# Patient Record
Sex: MALE | Race: WHITE | NOT HISPANIC OR LATINO | Employment: OTHER | URBAN - METROPOLITAN AREA
[De-identification: names, ages, dates, MRNs, and addresses within clinical notes are randomized per-mention and may not be internally consistent; named-entity substitution may affect disease eponyms.]

---

## 2017-01-05 DIAGNOSIS — K21.9 GASTROESOPHAGEAL REFLUX DISEASE, ESOPHAGITIS PRESENCE NOT SPECIFIED: Primary | ICD-10-CM

## 2017-01-05 RX ORDER — LANSOPRAZOLE 30 MG/1
30 CAPSULE, DELAYED RELEASE ORAL DAILY
Qty: 90 CAPSULE | Refills: 0 | Status: SHIPPED | OUTPATIENT
Start: 2017-01-05 | End: 2017-05-09 | Stop reason: SDUPTHER

## 2017-01-19 RX ORDER — ATORVASTATIN CALCIUM 20 MG/1
TABLET, FILM COATED ORAL
Qty: 30 TABLET | Refills: 2 | Status: SHIPPED | OUTPATIENT
Start: 2017-01-19 | End: 2017-04-02 | Stop reason: SDUPTHER

## 2017-04-03 RX ORDER — ATORVASTATIN CALCIUM 20 MG/1
TABLET, FILM COATED ORAL
Qty: 30 TABLET | Refills: 1 | Status: SHIPPED | OUTPATIENT
Start: 2017-04-03 | End: 2017-06-07 | Stop reason: SDUPTHER

## 2017-05-09 DIAGNOSIS — K21.9 GASTROESOPHAGEAL REFLUX DISEASE, ESOPHAGITIS PRESENCE NOT SPECIFIED: ICD-10-CM

## 2017-05-09 RX ORDER — LANSOPRAZOLE 30 MG/1
30 CAPSULE, DELAYED RELEASE ORAL DAILY
Qty: 90 CAPSULE | Refills: 0 | Status: SHIPPED | OUTPATIENT
Start: 2017-05-09 | End: 2017-08-07 | Stop reason: SDUPTHER

## 2017-06-07 RX ORDER — ATORVASTATIN CALCIUM 20 MG/1
TABLET, FILM COATED ORAL
Qty: 30 TABLET | Refills: 0 | Status: SHIPPED | OUTPATIENT
Start: 2017-06-07 | End: 2017-08-08 | Stop reason: SDUPTHER

## 2017-07-05 RX ORDER — ATORVASTATIN CALCIUM 20 MG/1
TABLET, FILM COATED ORAL
Qty: 30 TABLET | Refills: 0 | OUTPATIENT
Start: 2017-07-05

## 2017-07-10 RX ORDER — ATORVASTATIN CALCIUM 20 MG/1
TABLET, FILM COATED ORAL
Qty: 30 TABLET | Refills: 0 | OUTPATIENT
Start: 2017-07-10

## 2017-08-07 DIAGNOSIS — K21.9 GASTROESOPHAGEAL REFLUX DISEASE, ESOPHAGITIS PRESENCE NOT SPECIFIED: ICD-10-CM

## 2017-08-07 RX ORDER — LANSOPRAZOLE 30 MG/1
CAPSULE, DELAYED RELEASE ORAL
Qty: 90 CAPSULE | Refills: 3 | Status: SHIPPED | OUTPATIENT
Start: 2017-08-07 | End: 2018-08-02 | Stop reason: SDUPTHER

## 2017-08-08 RX ORDER — ATORVASTATIN CALCIUM 20 MG/1
TABLET, FILM COATED ORAL
Qty: 15 TABLET | Refills: 0 | Status: SHIPPED | OUTPATIENT
Start: 2017-08-08 | End: 2017-08-15

## 2017-08-11 RX ORDER — ATORVASTATIN CALCIUM 20 MG/1
TABLET, FILM COATED ORAL
Qty: 30 TABLET | Refills: 5 | Status: SHIPPED | OUTPATIENT
Start: 2017-08-11 | End: 2017-08-15 | Stop reason: SDUPTHER

## 2017-08-15 ENCOUNTER — OFFICE VISIT (OUTPATIENT)
Dept: FAMILY MEDICINE CLINIC | Facility: CLINIC | Age: 67
End: 2017-08-15

## 2017-08-15 ENCOUNTER — APPOINTMENT (OUTPATIENT)
Dept: LAB | Facility: HOSPITAL | Age: 67
End: 2017-08-15

## 2017-08-15 VITALS
TEMPERATURE: 98.5 F | SYSTOLIC BLOOD PRESSURE: 120 MMHG | WEIGHT: 175 LBS | HEART RATE: 78 BPM | HEIGHT: 72 IN | BODY MASS INDEX: 23.7 KG/M2 | DIASTOLIC BLOOD PRESSURE: 84 MMHG | OXYGEN SATURATION: 99 %

## 2017-08-15 DIAGNOSIS — Z00.8 ENCOUNTER FOR OTHER GENERAL EXAMINATION: Primary | ICD-10-CM

## 2017-08-15 DIAGNOSIS — Z72.0 TOBACCO ABUSE: ICD-10-CM

## 2017-08-15 DIAGNOSIS — E78.2 MIXED HYPERLIPIDEMIA: ICD-10-CM

## 2017-08-15 DIAGNOSIS — Z98.890 STATUS POST ROTATOR CUFF REPAIR: ICD-10-CM

## 2017-08-15 DIAGNOSIS — Z11.59 NEED FOR HEPATITIS C SCREENING TEST: ICD-10-CM

## 2017-08-15 DIAGNOSIS — Z12.5 PROSTATE CANCER SCREENING: ICD-10-CM

## 2017-08-15 LAB
ALBUMIN SERPL-MCNC: 4.5 G/DL (ref 3.2–4.8)
ALBUMIN/GLOB SERPL: 2.1 G/DL (ref 1.5–2.5)
ALP SERPL-CCNC: 54 U/L (ref 25–100)
ALT SERPL W P-5'-P-CCNC: 47 U/L (ref 7–40)
ANION GAP SERPL CALCULATED.3IONS-SCNC: -1 MMOL/L (ref 3–11)
ARTICHOKE IGE QN: 59 MG/DL (ref 0–130)
AST SERPL-CCNC: 35 U/L (ref 0–33)
BILIRUB BLD-MCNC: NEGATIVE MG/DL
BILIRUB SERPL-MCNC: 3.2 MG/DL (ref 0.3–1.2)
BUN BLD-MCNC: 19 MG/DL (ref 9–23)
BUN/CREAT SERPL: 21.1 (ref 7–25)
CALCIUM SPEC-SCNC: 9.2 MG/DL (ref 8.7–10.4)
CHLORIDE SERPL-SCNC: 107 MMOL/L (ref 99–109)
CHOLEST SERPL-MCNC: 145 MG/DL (ref 0–200)
CLARITY, POC: CLEAR
CO2 SERPL-SCNC: 34 MMOL/L (ref 20–31)
COLOR UR: YELLOW
CREAT BLD-MCNC: 0.9 MG/DL (ref 0.6–1.3)
DEPRECATED RDW RBC AUTO: 41.2 FL (ref 37–54)
ERYTHROCYTE [DISTWIDTH] IN BLOOD BY AUTOMATED COUNT: 12.2 % (ref 11.3–14.5)
GFR SERPL CREATININE-BSD FRML MDRD: 84 ML/MIN/1.73
GLOBULIN UR ELPH-MCNC: 2.1 GM/DL
GLUCOSE BLD-MCNC: 100 MG/DL (ref 70–100)
GLUCOSE UR STRIP-MCNC: NEGATIVE MG/DL
HCT VFR BLD AUTO: 42.5 % (ref 38.9–50.9)
HCV AB SER DONR QL: NORMAL
HDLC SERPL-MCNC: 73 MG/DL (ref 40–60)
HGB BLD-MCNC: 15 G/DL (ref 13.1–17.5)
KETONES UR QL: NEGATIVE
LEUKOCYTE EST, POC: NEGATIVE
MCH RBC QN AUTO: 32.4 PG (ref 27–31)
MCHC RBC AUTO-ENTMCNC: 35.3 G/DL (ref 32–36)
MCV RBC AUTO: 91.8 FL (ref 80–99)
NITRITE UR-MCNC: NEGATIVE MG/ML
PH UR: 7 [PH] (ref 5–8)
PLATELET # BLD AUTO: 88 10*3/MM3 (ref 150–450)
PMV BLD AUTO: 10.2 FL (ref 6–12)
POTASSIUM BLD-SCNC: 4.3 MMOL/L (ref 3.5–5.5)
PROT SERPL-MCNC: 6.6 G/DL (ref 5.7–8.2)
PROT UR STRIP-MCNC: NEGATIVE MG/DL
PSA SERPL-MCNC: 0.2 NG/ML (ref 0–4)
RBC # BLD AUTO: 4.63 10*6/MM3 (ref 4.2–5.76)
RBC # UR STRIP: NEGATIVE /UL
SODIUM BLD-SCNC: 140 MMOL/L (ref 132–146)
SP GR UR: 1.01 (ref 1–1.03)
T4 FREE SERPL-MCNC: 0.98 NG/DL (ref 0.89–1.76)
TRIGL SERPL-MCNC: 75 MG/DL (ref 0–150)
TSH SERPL DL<=0.05 MIU/L-ACNC: 3.09 MIU/ML (ref 0.35–5.35)
UROBILINOGEN UR QL: ABNORMAL
WBC NRBC COR # BLD: 3.98 10*3/MM3 (ref 3.5–10.8)

## 2017-08-15 PROCEDURE — 86803 HEPATITIS C AB TEST: CPT | Performed by: FAMILY MEDICINE

## 2017-08-15 PROCEDURE — 85027 COMPLETE CBC AUTOMATED: CPT | Performed by: FAMILY MEDICINE

## 2017-08-15 PROCEDURE — 99397 PER PM REEVAL EST PAT 65+ YR: CPT | Performed by: FAMILY MEDICINE

## 2017-08-15 PROCEDURE — G0103 PSA SCREENING: HCPCS | Performed by: FAMILY MEDICINE

## 2017-08-15 PROCEDURE — 36415 COLL VENOUS BLD VENIPUNCTURE: CPT | Performed by: FAMILY MEDICINE

## 2017-08-15 PROCEDURE — 93000 ELECTROCARDIOGRAM COMPLETE: CPT | Performed by: FAMILY MEDICINE

## 2017-08-15 PROCEDURE — 80053 COMPREHEN METABOLIC PANEL: CPT | Performed by: FAMILY MEDICINE

## 2017-08-15 PROCEDURE — 80061 LIPID PANEL: CPT | Performed by: FAMILY MEDICINE

## 2017-08-15 PROCEDURE — 84439 ASSAY OF FREE THYROXINE: CPT | Performed by: FAMILY MEDICINE

## 2017-08-15 PROCEDURE — 84443 ASSAY THYROID STIM HORMONE: CPT | Performed by: FAMILY MEDICINE

## 2017-08-15 PROCEDURE — 81003 URINALYSIS AUTO W/O SCOPE: CPT | Performed by: FAMILY MEDICINE

## 2017-08-15 RX ORDER — ATORVASTATIN CALCIUM 20 MG/1
20 TABLET, FILM COATED ORAL DAILY
Qty: 30 TABLET | Refills: 11 | Status: SHIPPED | OUTPATIENT
Start: 2017-08-15 | End: 2018-09-12 | Stop reason: SDUPTHER

## 2017-08-15 NOTE — PROGRESS NOTES
Subjective   Tucker Johnson is a 67 y.o. male    HPI Comments: Patient presents today for annual physical exam and wellness visit.  He has chronic problems of hyperlipidemia, GERD, and allergic rhinitis.  He reports relatively recent right rotator cuff surgery repair that apparently went well.  Prior to that he had suffered a right biceps tear.  He has no acute medical complaints today.  His medication list is reviewed.  He has a colonoscopy scheduled next week with Dr. Lorenzo.  He received flu, shingles and pneumonia vaccines in 2016.  He is fasting for lab studies today.      The following portions of the patient's history were reviewed and updated as appropriate: allergies, current medications, past social history and problem list    Review of Systems   Constitutional: Negative.  Negative for chills, fatigue and fever.   HENT: Negative.    Eyes: Negative.    Respiratory: Negative.  Negative for cough, chest tightness, shortness of breath and wheezing.    Cardiovascular: Negative.  Negative for chest pain, palpitations and leg swelling.   Gastrointestinal: Negative.  Negative for abdominal pain, nausea and vomiting.   Endocrine: Negative.  Negative for polydipsia, polyphagia and polyuria.   Genitourinary: Negative.  Negative for dysuria, frequency and urgency.   Musculoskeletal: Negative.  Negative for arthralgias, back pain and myalgias.   Skin: Negative.  Negative for color change and rash.   Allergic/Immunologic: Negative.    Neurological: Negative.  Negative for weakness and headaches.   Hematological: Negative.  Negative for adenopathy. Does not bruise/bleed easily.   Psychiatric/Behavioral: Negative.    All other systems reviewed and are negative.      Objective     Vitals:    08/15/17 0902   BP: 120/84   Pulse: 78   Temp: 98.5 °F (36.9 °C)   SpO2: 99%       Physical Exam   Constitutional: He is oriented to person, place, and time. He appears well-developed and well-nourished.   HENT:   Head: Normocephalic and  atraumatic.   Right Ear: External ear normal.   Left Ear: External ear normal.   Nose: Nose normal.   Mouth/Throat: Oropharynx is clear and moist.   Eyes: Conjunctivae and EOM are normal. Pupils are equal, round, and reactive to light.   Neck: Normal range of motion. Neck supple. No thyromegaly present.   Cardiovascular: Normal rate, regular rhythm, normal heart sounds and intact distal pulses.    No murmur heard.  Pulmonary/Chest: Effort normal and breath sounds normal.   Abdominal: Soft. Bowel sounds are normal. He exhibits no mass. There is no tenderness.   Genitourinary: Rectum normal, prostate normal and penis normal.   Musculoskeletal: Normal range of motion. He exhibits deformity. He exhibits no edema.   Biceps deformity consistent with ruptured biceps tendon. FROM.   Lymphadenopathy:     He has no cervical adenopathy.   Neurological: He is alert and oriented to person, place, and time. He has normal reflexes. No cranial nerve deficit.   Skin: Skin is warm and dry. No rash noted.   Psychiatric: He has a normal mood and affect. His behavior is normal.   Nursing note and vitals reviewed.        ECG 12 Lead  Date/Time: 8/15/2017 5:12 PM  Performed by: ELIZABETH HARTMAN  Authorized by: ELIZABETH HARTMAN   Comparison: not compared with previous ECG   Rhythm: sinus rhythm  Rate: normal  Conduction: conduction normal  ST Segments: ST segments normal  T Waves: T waves normal  QRS axis: normal  Other: no other findings  Clinical impression: normal ECG          Assessment/Plan   Problem List Items Addressed This Visit     None      Visit Diagnoses     Encounter for other general examination    -  Primary    Relevant Orders    POC Urinalysis Dipstick, Automated (Completed)    CBC (No Diff) (Completed)    Comprehensive Metabolic Panel (Completed)    Lipid Panel (Completed)    TSH (Completed)    T4, Free (Completed)    Prostate cancer screening        Relevant Orders    PSA Screen (Completed)    Need for  hepatitis C screening test        Relevant Orders    Hepatitis C Antibody (Completed)    Mixed hyperlipidemia        Relevant Medications    atorvastatin (LIPITOR) 20 MG tablet    Other Relevant Orders    Comprehensive Metabolic Panel (Completed)    Lipid Panel (Completed)    Status post rotator cuff repair        Tobacco abuse            Patient is counseled during today's visit regarding preventative health measures to include use of seatbelts, medication safety, healthy diet and regular exercise.

## 2017-08-24 ENCOUNTER — OUTSIDE FACILITY SERVICE (OUTPATIENT)
Dept: GASTROENTEROLOGY | Facility: CLINIC | Age: 67
End: 2017-08-24

## 2017-08-24 PROCEDURE — G0105 COLORECTAL SCRN; HI RISK IND: HCPCS | Performed by: INTERNAL MEDICINE

## 2017-11-14 DIAGNOSIS — H69.80 EUSTACHIAN TUBE DYSFUNCTION, UNSPECIFIED LATERALITY: ICD-10-CM

## 2017-11-14 RX ORDER — PSEUDOEPHEDRINE HYDROCHLORIDE 120 MG/1
TABLET, FILM COATED, EXTENDED RELEASE ORAL
Qty: 60 TABLET | Refills: 10 | Status: SHIPPED | OUTPATIENT
Start: 2017-11-14 | End: 2019-03-07 | Stop reason: SDUPTHER

## 2018-07-02 ENCOUNTER — LAB (OUTPATIENT)
Dept: LAB | Facility: HOSPITAL | Age: 68
End: 2018-07-02

## 2018-07-02 ENCOUNTER — OFFICE VISIT (OUTPATIENT)
Dept: FAMILY MEDICINE CLINIC | Facility: CLINIC | Age: 68
End: 2018-07-02

## 2018-07-02 VITALS
HEIGHT: 72 IN | DIASTOLIC BLOOD PRESSURE: 80 MMHG | BODY MASS INDEX: 26.3 KG/M2 | SYSTOLIC BLOOD PRESSURE: 130 MMHG | HEART RATE: 78 BPM | OXYGEN SATURATION: 98 % | WEIGHT: 194.2 LBS | TEMPERATURE: 97.4 F

## 2018-07-02 DIAGNOSIS — I77.6 VASCULITIS (HCC): ICD-10-CM

## 2018-07-02 DIAGNOSIS — I77.6 VASCULITIS (HCC): Primary | ICD-10-CM

## 2018-07-02 LAB
ALBUMIN SERPL-MCNC: 4.53 G/DL (ref 3.2–4.8)
ALBUMIN/GLOB SERPL: 2.2 G/DL (ref 1.5–2.5)
ALP SERPL-CCNC: 47 U/L (ref 25–100)
ALT SERPL W P-5'-P-CCNC: 43 U/L (ref 7–40)
ANION GAP SERPL CALCULATED.3IONS-SCNC: 7 MMOL/L (ref 3–11)
AST SERPL-CCNC: 32 U/L (ref 0–33)
BASOPHILS # BLD AUTO: 0.02 10*3/MM3 (ref 0–0.2)
BASOPHILS NFR BLD AUTO: 0.4 % (ref 0–1)
BILIRUB SERPL-MCNC: 3.7 MG/DL (ref 0.3–1.2)
BUN BLD-MCNC: 18 MG/DL (ref 9–23)
BUN/CREAT SERPL: 18.6 (ref 7–25)
CALCIUM SPEC-SCNC: 9.2 MG/DL (ref 8.7–10.4)
CHLORIDE SERPL-SCNC: 108 MMOL/L (ref 99–109)
CO2 SERPL-SCNC: 29 MMOL/L (ref 20–31)
CREAT BLD-MCNC: 0.97 MG/DL (ref 0.6–1.3)
CRP SERPL-MCNC: <0.01 MG/DL (ref 0–1)
DEPRECATED RDW RBC AUTO: 42.4 FL (ref 37–54)
EOSINOPHIL # BLD AUTO: 0.11 10*3/MM3 (ref 0–0.3)
EOSINOPHIL NFR BLD AUTO: 2.3 % (ref 0–3)
ERYTHROCYTE [DISTWIDTH] IN BLOOD BY AUTOMATED COUNT: 12.4 % (ref 11.3–14.5)
ERYTHROCYTE [SEDIMENTATION RATE] IN BLOOD: <1 MM/HR (ref 0–20)
GFR SERPL CREATININE-BSD FRML MDRD: 77 ML/MIN/1.73
GLOBULIN UR ELPH-MCNC: 2.1 GM/DL
GLUCOSE BLD-MCNC: 98 MG/DL (ref 70–100)
HCT VFR BLD AUTO: 43.8 % (ref 38.9–50.9)
HGB BLD-MCNC: 15.4 G/DL (ref 13.1–17.5)
IMM GRANULOCYTES # BLD: 0.01 10*3/MM3 (ref 0–0.03)
IMM GRANULOCYTES NFR BLD: 0.2 % (ref 0–0.6)
LYMPHOCYTES # BLD AUTO: 1.95 10*3/MM3 (ref 0.6–4.8)
LYMPHOCYTES NFR BLD AUTO: 40 % (ref 24–44)
MCH RBC QN AUTO: 33 PG (ref 27–31)
MCHC RBC AUTO-ENTMCNC: 35.2 G/DL (ref 32–36)
MCV RBC AUTO: 94 FL (ref 80–99)
MONOCYTES # BLD AUTO: 0.59 10*3/MM3 (ref 0–1)
MONOCYTES NFR BLD AUTO: 12.1 % (ref 0–12)
NEUTROPHILS # BLD AUTO: 2.21 10*3/MM3 (ref 1.5–8.3)
NEUTROPHILS NFR BLD AUTO: 45.2 % (ref 41–71)
PLATELET # BLD AUTO: 78 10*3/MM3 (ref 150–450)
PMV BLD AUTO: 11.5 FL (ref 6–12)
POTASSIUM BLD-SCNC: 4.6 MMOL/L (ref 3.5–5.5)
PROT SERPL-MCNC: 6.6 G/DL (ref 5.7–8.2)
RBC # BLD AUTO: 4.66 10*6/MM3 (ref 4.2–5.76)
SODIUM BLD-SCNC: 144 MMOL/L (ref 132–146)
WBC NRBC COR # BLD: 4.88 10*3/MM3 (ref 3.5–10.8)

## 2018-07-02 PROCEDURE — 99213 OFFICE O/P EST LOW 20 MIN: CPT | Performed by: FAMILY MEDICINE

## 2018-07-02 PROCEDURE — 80053 COMPREHEN METABOLIC PANEL: CPT

## 2018-07-02 PROCEDURE — 86038 ANTINUCLEAR ANTIBODIES: CPT

## 2018-07-02 PROCEDURE — 36415 COLL VENOUS BLD VENIPUNCTURE: CPT

## 2018-07-02 PROCEDURE — 85025 COMPLETE CBC W/AUTO DIFF WBC: CPT

## 2018-07-02 PROCEDURE — 86140 C-REACTIVE PROTEIN: CPT

## 2018-07-02 NOTE — PROGRESS NOTES
Subjective   Tucker Johnson is a 68 y.o. male    Patient presents for follow-up related to a rash on his lower extremities.  Primarily occurred on the left foot and ankle but also had some lesions on the right foot and ankle.  He was seen at urgent care on these were described as vasculitis type lesions.  He was treated with a steroid Dosepak and his lesions resolved.  He has had return of a few petechial type lesions on the left foot but nothing like what he had initially.  He is concerned about what all this could mean.  He has had no pain, fever, chills, arthralgias, myalgias or other symptoms.      Rash         The following portions of the patient's history were reviewed and updated as appropriate: allergies, current medications, past social history and problem list    Review of Systems   Constitutional: Negative.    HENT: Negative.    Eyes: Negative.    Respiratory: Negative.    Cardiovascular: Negative.    Endocrine: Negative.    Genitourinary: Negative.    Musculoskeletal: Negative.    Skin: Positive for rash.   Neurological: Negative.    Hematological: Negative.        Objective     Vitals:    07/02/18 1035   BP: 130/80   Pulse: 78   Temp: 97.4 °F (36.3 °C)   SpO2: 98%       Physical Exam   Constitutional: He is oriented to person, place, and time.   Cardiovascular: Normal rate and regular rhythm.    Pulmonary/Chest: Effort normal.   Abdominal: Soft. He exhibits no mass. There is no tenderness.   Neurological: He is alert and oriented to person, place, and time.   Skin:   10-12 petechial lesions left foot.  No other skin lesions, rash or vesicular changes noted.   Psychiatric: He has a normal mood and affect.   Nursing note and vitals reviewed.      Assessment/Plan   Problem List Items Addressed This Visit     None      Visit Diagnoses     Vasculitis    -  Primary    Relevant Orders    CBC & Differential    Comprehensive Metabolic Panel    Nuclear Antigen Antibody, IFA    C-reactive Protein    Sedimentation  Rate

## 2018-07-03 LAB
ANA HOMOGEN TITR SER: NORMAL {TITER}
ANA SER QL IA: POSITIVE
Lab: NORMAL

## 2018-08-02 DIAGNOSIS — K21.9 GASTROESOPHAGEAL REFLUX DISEASE, ESOPHAGITIS PRESENCE NOT SPECIFIED: ICD-10-CM

## 2018-08-02 RX ORDER — LANSOPRAZOLE 30 MG/1
CAPSULE, DELAYED RELEASE ORAL
Qty: 90 CAPSULE | Refills: 0 | Status: SHIPPED | OUTPATIENT
Start: 2018-08-02 | End: 2018-10-31 | Stop reason: SDUPTHER

## 2018-08-21 ENCOUNTER — OFFICE VISIT (OUTPATIENT)
Dept: FAMILY MEDICINE CLINIC | Facility: CLINIC | Age: 68
End: 2018-08-21

## 2018-08-21 ENCOUNTER — LAB (OUTPATIENT)
Dept: LAB | Facility: HOSPITAL | Age: 68
End: 2018-08-21

## 2018-08-21 VITALS
OXYGEN SATURATION: 99 % | SYSTOLIC BLOOD PRESSURE: 118 MMHG | TEMPERATURE: 98.5 F | HEIGHT: 72 IN | WEIGHT: 193 LBS | DIASTOLIC BLOOD PRESSURE: 74 MMHG | BODY MASS INDEX: 26.14 KG/M2 | HEART RATE: 69 BPM

## 2018-08-21 DIAGNOSIS — Z00.00 ROUTINE GENERAL MEDICAL EXAMINATION AT A HEALTH CARE FACILITY: Primary | ICD-10-CM

## 2018-08-21 DIAGNOSIS — E78.2 MIXED HYPERLIPIDEMIA: ICD-10-CM

## 2018-08-21 DIAGNOSIS — Z12.5 PROSTATE CANCER SCREENING: ICD-10-CM

## 2018-08-21 DIAGNOSIS — R53.83 FATIGUE, UNSPECIFIED TYPE: ICD-10-CM

## 2018-08-21 DIAGNOSIS — K21.9 GASTROESOPHAGEAL REFLUX DISEASE, ESOPHAGITIS PRESENCE NOT SPECIFIED: ICD-10-CM

## 2018-08-21 DIAGNOSIS — J30.9 CHRONIC ALLERGIC RHINITIS, UNSPECIFIED SEASONALITY, UNSPECIFIED TRIGGER: ICD-10-CM

## 2018-08-21 LAB
ARTICHOKE IGE QN: 68 MG/DL (ref 0–130)
CHOLEST SERPL-MCNC: 157 MG/DL (ref 0–200)
HDLC SERPL-MCNC: 76 MG/DL (ref 40–60)
PSA SERPL-MCNC: 0.22 NG/ML (ref 0–4)
T4 FREE SERPL-MCNC: 0.88 NG/DL (ref 0.89–1.76)
TRIGL SERPL-MCNC: 74 MG/DL (ref 0–150)
TSH SERPL DL<=0.05 MIU/L-ACNC: 3.54 MIU/ML (ref 0.35–5.35)

## 2018-08-21 PROCEDURE — 84403 ASSAY OF TOTAL TESTOSTERONE: CPT

## 2018-08-21 PROCEDURE — 99397 PER PM REEVAL EST PAT 65+ YR: CPT | Performed by: FAMILY MEDICINE

## 2018-08-21 PROCEDURE — 93000 ELECTROCARDIOGRAM COMPLETE: CPT | Performed by: FAMILY MEDICINE

## 2018-08-21 PROCEDURE — 84402 ASSAY OF FREE TESTOSTERONE: CPT

## 2018-08-21 PROCEDURE — 80061 LIPID PANEL: CPT

## 2018-08-21 PROCEDURE — 36415 COLL VENOUS BLD VENIPUNCTURE: CPT

## 2018-08-21 PROCEDURE — 84443 ASSAY THYROID STIM HORMONE: CPT

## 2018-08-21 PROCEDURE — G0103 PSA SCREENING: HCPCS

## 2018-08-21 PROCEDURE — 84439 ASSAY OF FREE THYROXINE: CPT

## 2018-08-21 NOTE — PROGRESS NOTES
Subjective   Tucker Johnson is a 68 y.o. male    Patient here for annual physical examination.  Chronic medical problems include hyperlipidemia, GERD, allergic rhinitis and osteoarthritis.  Recent labs for suspected vasculitis were all essentially normal.  These were reviewed in detail with him today.  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  His platelets were slightly low.  Health maintenance issues are reviewed with the patient today.  He thinks he has had more than 1 pneumonia shot in the past so we will have to do some research.  I've also recommended he consider the new shingles vaccine.  His primary concern and complaint today is that of fatigue.      Hyperlipidemia   Associated symptoms include myalgias. Pertinent negatives include no chest pain or shortness of breath.   Arthritis   Associated symptoms include fatigue. Pertinent negatives include no dysuria, fever or rash.   Fatigue   Associated symptoms include arthralgias, fatigue and myalgias. Pertinent negatives include no abdominal pain, chest pain, chills, coughing, fever, headaches, nausea, rash, vomiting or weakness.       The following portions of the patient's history were reviewed and updated as appropriate: allergies, current medications, past social history and problem list    Review of Systems   Constitutional: Positive for fatigue. Negative for chills and fever.   HENT: Negative.  Negative for trouble swallowing and voice change.    Eyes: Negative.    Respiratory: Negative.  Negative for cough, chest tightness, shortness of breath and wheezing.    Cardiovascular: Negative.  Negative for chest pain, palpitations and leg swelling.   Gastrointestinal: Negative.  Negative for abdominal pain, nausea and vomiting.   Endocrine: Negative.  Negative for polydipsia, polyphagia and polyuria.   Genitourinary: Negative.  Negative for dysuria, frequency and urgency.   Musculoskeletal: Positive for arthralgias, arthritis,  back pain and myalgias.   Skin: Negative.  Negative for color change and rash.   Allergic/Immunologic: Negative.    Neurological: Negative.  Negative for weakness and headaches.   Hematological: Negative.  Negative for adenopathy. Does not bruise/bleed easily.   Psychiatric/Behavioral: Negative.    All other systems reviewed and are negative.      Objective     Vitals:    08/21/18 1030   BP: 118/74   Pulse: 69   Temp: 98.5 °F (36.9 °C)   SpO2: 99%       Physical Exam   Constitutional: He is oriented to person, place, and time. He appears well-developed and well-nourished.   HENT:   Head: Normocephalic and atraumatic.   Right Ear: External ear normal.   Left Ear: External ear normal.   Nose: Nose normal.   Mouth/Throat: Oropharynx is clear and moist.   Eyes: Pupils are equal, round, and reactive to light. Conjunctivae and EOM are normal.   Neck: Normal range of motion. Neck supple. No thyromegaly present.   Cardiovascular: Normal rate, regular rhythm, normal heart sounds and intact distal pulses.    No murmur heard.  Pulmonary/Chest: Effort normal and breath sounds normal.   Abdominal: Soft. Bowel sounds are normal. He exhibits no mass. There is no tenderness.   Genitourinary: Rectum normal, prostate normal and penis normal.   Musculoskeletal: Normal range of motion. He exhibits no edema.   Neurological: He is alert and oriented to person, place, and time. He has normal reflexes. No cranial nerve deficit.   Skin: Skin is warm and dry.   Psychiatric: He has a normal mood and affect. His speech is normal and behavior is normal. Judgment and thought content normal. Cognition and memory are normal. He is attentive.   Nursing note and vitals reviewed.      ECG 12 Lead  Date/Time: 8/21/2018 1:05 PM  Performed by: ELIZABETH HARTMAN  Authorized by: ELIZABETH HARTMAN   Comparison: not compared with previous ECG   Rhythm: sinus rhythm  Rate: normal  Conduction: conduction normal  ST Segments: ST segments  normal  T Waves: T waves normal  QRS axis: normal  Other: no other findings  Clinical impression: normal ECG          Assessment/Plan   Problem List Items Addressed This Visit     None      Visit Diagnoses     Routine general medical examination at a health care facility    -  Primary    Prostate cancer screening        Relevant Orders    PSA Screen    Mixed hyperlipidemia        Relevant Orders    Lipid Panel    Chronic allergic rhinitis, unspecified seasonality, unspecified trigger        Gastroesophageal reflux disease, esophagitis presence not specified        Fatigue, unspecified type        Relevant Orders    TSH    T4, Free    Testosterone, Free, Total        Patient is counseled during today's visit regarding preventative health measures to include use of seatbelts, medication safety, healthy diet and regular exercise.

## 2018-08-23 LAB
TESTOST FREE SERPL-MCNC: 3.9 PG/ML (ref 6.6–18.1)
TESTOST SERPL-MCNC: 541 NG/DL (ref 264–916)

## 2018-09-14 RX ORDER — ATORVASTATIN CALCIUM 20 MG/1
TABLET, FILM COATED ORAL
Qty: 90 TABLET | Refills: 1 | Status: SHIPPED | OUTPATIENT
Start: 2018-09-14 | End: 2019-03-30 | Stop reason: SDUPTHER

## 2018-10-02 ENCOUNTER — TELEPHONE (OUTPATIENT)
Dept: FAMILY MEDICINE CLINIC | Facility: CLINIC | Age: 68
End: 2018-10-02

## 2018-10-02 NOTE — TELEPHONE ENCOUNTER
----- Message from Dalia Harrell sent at 10/2/2018 11:32 AM EDT -----  Contact: PT  WOULD LIKE A CALL RE: BLOODWORK RESULTS FROM 8/21 VISIT, HAS NOT RECEIVED LETTER OR CALL TO DATE.

## 2018-10-31 DIAGNOSIS — K21.9 GASTROESOPHAGEAL REFLUX DISEASE, ESOPHAGITIS PRESENCE NOT SPECIFIED: ICD-10-CM

## 2018-11-01 RX ORDER — LANSOPRAZOLE 30 MG/1
CAPSULE, DELAYED RELEASE ORAL
Qty: 90 CAPSULE | Refills: 2 | Status: SHIPPED | OUTPATIENT
Start: 2018-11-01 | End: 2019-07-29 | Stop reason: SDUPTHER

## 2018-11-09 ENCOUNTER — OFFICE VISIT (OUTPATIENT)
Dept: FAMILY MEDICINE CLINIC | Facility: CLINIC | Age: 68
End: 2018-11-09

## 2018-11-09 VITALS
HEART RATE: 74 BPM | OXYGEN SATURATION: 98 % | WEIGHT: 194.4 LBS | TEMPERATURE: 98 F | DIASTOLIC BLOOD PRESSURE: 68 MMHG | BODY MASS INDEX: 26.33 KG/M2 | RESPIRATION RATE: 16 BRPM | HEIGHT: 72 IN | SYSTOLIC BLOOD PRESSURE: 119 MMHG

## 2018-11-09 DIAGNOSIS — J01.00 ACUTE MAXILLARY SINUSITIS, RECURRENCE NOT SPECIFIED: Primary | ICD-10-CM

## 2018-11-09 PROCEDURE — 99213 OFFICE O/P EST LOW 20 MIN: CPT | Performed by: PHYSICIAN ASSISTANT

## 2018-11-09 RX ORDER — PREDNISONE 20 MG/1
20 TABLET ORAL 2 TIMES DAILY
Qty: 14 TABLET | Refills: 0 | Status: SHIPPED | OUTPATIENT
Start: 2018-11-09 | End: 2019-03-19

## 2018-11-09 RX ORDER — PREDNISONE 20 MG/1
20 TABLET ORAL 2 TIMES DAILY
Qty: 14 TABLET | Refills: 0 | Status: SHIPPED | OUTPATIENT
Start: 2018-11-09 | End: 2018-11-09 | Stop reason: SDUPTHER

## 2018-11-09 RX ORDER — CEFDINIR 300 MG/1
300 CAPSULE ORAL 2 TIMES DAILY
Qty: 20 CAPSULE | Refills: 0 | Status: SHIPPED | OUTPATIENT
Start: 2018-11-09 | End: 2018-11-09 | Stop reason: SDUPTHER

## 2018-11-09 RX ORDER — CEFDINIR 300 MG/1
300 CAPSULE ORAL 2 TIMES DAILY
Qty: 20 CAPSULE | Refills: 0 | Status: SHIPPED | OUTPATIENT
Start: 2018-11-09 | End: 2019-03-19

## 2018-11-09 NOTE — PROGRESS NOTES
Subjective   Tucker Johnson is a 68 y.o. male  Nasal Congestion (PND, cough, HA, fatigue x3 days. Treating with Sudafed.)      Sinusitis   This is a new problem. The problem has been gradually improving since onset. His pain is at a severity of 7/10. Associated symptoms include congestion, coughing, ear pain, headaches, sinus pressure and swollen glands. Pertinent negatives include no chills, shortness of breath or sore throat. The treatment provided moderate relief.       The following portions of the patient's history were reviewed and updated as appropriate: allergies, current medications, past social history and problem list    Review of Systems   Constitutional: Negative for chills, fatigue, fever and unexpected weight change.   HENT: Positive for congestion, ear pain, postnasal drip, rhinorrhea and sinus pressure. Negative for sore throat.    Eyes: Positive for pain.   Respiratory: Positive for cough. Negative for chest tightness and shortness of breath.    Cardiovascular: Negative for chest pain, palpitations and leg swelling.   Gastrointestinal: Negative for nausea.   Skin: Negative for color change and rash.   Neurological: Positive for headaches. Negative for dizziness, syncope and weakness.   Hematological: Negative for adenopathy.       Objective     Vitals:    11/09/18 1426   BP: 119/68   Pulse: 74   Resp: 16   Temp: 98 °F (36.7 °C)   SpO2: 98%       Physical Exam   Constitutional: He appears well-developed and well-nourished.   HENT:   Head: Normocephalic and atraumatic.   Right Ear: Tympanic membrane and ear canal normal.   Left Ear: Tympanic membrane and ear canal normal.   Nose: Mucosal edema, rhinorrhea and sinus tenderness present. Right sinus exhibits maxillary sinus tenderness and frontal sinus tenderness. Left sinus exhibits maxillary sinus tenderness and frontal sinus tenderness.   Mouth/Throat: Oropharynx is clear and moist. No oropharyngeal exudate.   Eyes: Pupils are equal, round, and  reactive to light.   Cardiovascular: Normal rate and regular rhythm.    Pulmonary/Chest: Effort normal and breath sounds normal.   Nursing note and vitals reviewed.      Assessment/Plan     Diagnoses and all orders for this visit:    Acute maxillary sinusitis, recurrence not specified  -     Discontinue: cefdinir (OMNICEF) 300 MG capsule; Take 1 capsule by mouth 2 (Two) Times a Day.  -     cefdinir (OMNICEF) 300 MG capsule; Take 1 capsule by mouth 2 (Two) Times a Day.    Other orders  -     Discontinue: predniSONE (DELTASONE) 20 MG tablet; Take 1 tablet by mouth 2 (Two) Times a Day.  -     predniSONE (DELTASONE) 20 MG tablet; Take 1 tablet by mouth 2 (Two) Times a Day.    follow as needed

## 2019-03-07 DIAGNOSIS — H69.80 EUSTACHIAN TUBE DYSFUNCTION, UNSPECIFIED LATERALITY: ICD-10-CM

## 2019-03-07 RX ORDER — PSEUDOEPHEDRINE HCL 120 MG/1
TABLET, FILM COATED, EXTENDED RELEASE ORAL
Qty: 60 TABLET | Refills: 9 | Status: SHIPPED | OUTPATIENT
Start: 2019-03-07 | End: 2019-12-03

## 2019-03-19 ENCOUNTER — OFFICE VISIT (OUTPATIENT)
Dept: FAMILY MEDICINE CLINIC | Facility: CLINIC | Age: 69
End: 2019-03-19

## 2019-03-19 VITALS
SYSTOLIC BLOOD PRESSURE: 136 MMHG | WEIGHT: 190.6 LBS | HEART RATE: 82 BPM | RESPIRATION RATE: 14 BRPM | HEIGHT: 72 IN | OXYGEN SATURATION: 99 % | BODY MASS INDEX: 25.81 KG/M2 | DIASTOLIC BLOOD PRESSURE: 80 MMHG

## 2019-03-19 DIAGNOSIS — R42 VERTIGO: Primary | ICD-10-CM

## 2019-03-19 PROCEDURE — 99213 OFFICE O/P EST LOW 20 MIN: CPT | Performed by: PHYSICIAN ASSISTANT

## 2019-03-19 NOTE — PROGRESS NOTES
Subjective   Tucker Johnson is a 68 y.o. male  Dizziness (Intermittent x3-4 weeks when laying down and rolls over/sits up. Resolves after several minutes but worse today.)      History of Present Illness  Patient is a pleasant 68-year-old white male who comes in for dizziness and vertigo patient states he has been severely lays down and sits up worse with lying down and raising is been over the last 3 weeks has a fever chills nausea vomiting  The following portions of the patient's history were reviewed and updated as appropriate: allergies, current medications, past social history and problem list    Review of Systems   Constitutional: Negative for appetite change, diaphoresis, fatigue and unexpected weight change.   Eyes: Negative for visual disturbance.   Respiratory: Negative for cough, chest tightness and shortness of breath.    Cardiovascular: Negative for chest pain, palpitations and leg swelling.   Gastrointestinal: Negative for diarrhea, nausea and vomiting.   Endocrine: Negative for polydipsia, polyphagia and polyuria.   Skin: Negative for color change and rash.   Neurological: Negative for dizziness, syncope, weakness, light-headedness, numbness and headaches.       Objective     Vitals:    03/19/19 1002   BP: 136/80   Pulse: 82   Resp: 14   SpO2: 99%       Physical Exam   Constitutional: He appears well-developed and well-nourished.   Neck: Neck supple. No JVD present. No thyromegaly present.   Cardiovascular: Normal rate, regular rhythm, normal heart sounds, intact distal pulses and normal pulses.   No murmur heard.  Pulmonary/Chest: Effort normal and breath sounds normal. No respiratory distress.   Abdominal: Soft. Bowel sounds are normal. There is no hepatosplenomegaly. There is no tenderness.   Musculoskeletal: He exhibits no edema.   Lymphadenopathy:     He has no cervical adenopathy.   Neurological: No sensory deficit.   Skin: Skin is warm and dry. He is not diaphoretic.   Nursing note and vitals  reviewed.      Assessment/Plan     Diagnoses and all orders for this visit:    Vertigo    #1 Antivert 25 mg 1 p.o. 3 times daily dispense 30    Follow-up with no better

## 2019-04-01 RX ORDER — ATORVASTATIN CALCIUM 20 MG/1
TABLET, FILM COATED ORAL
Qty: 90 TABLET | Refills: 0 | Status: SHIPPED | OUTPATIENT
Start: 2019-04-01 | End: 2019-07-03 | Stop reason: SDUPTHER

## 2019-04-02 ENCOUNTER — OFFICE VISIT (OUTPATIENT)
Dept: FAMILY MEDICINE CLINIC | Facility: CLINIC | Age: 69
End: 2019-04-02

## 2019-04-02 VITALS
HEIGHT: 72 IN | OXYGEN SATURATION: 99 % | BODY MASS INDEX: 26.06 KG/M2 | DIASTOLIC BLOOD PRESSURE: 72 MMHG | WEIGHT: 192.4 LBS | RESPIRATION RATE: 14 BRPM | SYSTOLIC BLOOD PRESSURE: 120 MMHG | HEART RATE: 73 BPM

## 2019-04-02 DIAGNOSIS — R42 VERTIGO: Primary | ICD-10-CM

## 2019-04-02 PROCEDURE — 99213 OFFICE O/P EST LOW 20 MIN: CPT | Performed by: PHYSICIAN ASSISTANT

## 2019-04-02 RX ORDER — MECLIZINE HYDROCHLORIDE 25 MG/1
25 TABLET ORAL 3 TIMES DAILY
Refills: 0 | COMMUNITY
Start: 2019-03-19 | End: 2019-04-02

## 2019-04-02 NOTE — PROGRESS NOTES
Subjective   Tucker Johnson is a 69 y.o. male  Dizziness (F/U. Seen March 19 and prescribed meclizine but had little improvement.)      History of Present Illness  Patient is a 69-year-old white male who comes in for follow-up of vertigo meds are helping a little bit but still having large amount of vertigo usually notices it when he says at his desk and looks down last for a couple seconds when he gets up he also notices vertigo he has hearing loss bilaterally.  Patient states the only improved about 10% he states sleeping and angle did help symptoms but still having symptoms  The following portions of the patient's history were reviewed and updated as appropriate: allergies, current medications, past social history and problem list    Review of Systems   Constitutional: Negative for appetite change, diaphoresis, fatigue and unexpected weight change.   Eyes: Negative for visual disturbance.   Respiratory: Negative for cough, chest tightness and shortness of breath.    Cardiovascular: Negative for chest pain, palpitations and leg swelling.   Gastrointestinal: Negative for diarrhea, nausea and vomiting.   Endocrine: Negative for polydipsia, polyphagia and polyuria.   Skin: Negative for color change and rash.   Neurological: Negative for dizziness, syncope, weakness, light-headedness, numbness and headaches.       Objective     Vitals:    04/02/19 0909   BP: 120/72   Pulse: 73   Resp: 14   SpO2: 99%       Physical Exam   Constitutional: He appears well-developed and well-nourished.   Neck: No JVD present.   Cardiovascular: Normal rate, regular rhythm, normal heart sounds, intact distal pulses and normal pulses.   No murmur heard.  Pulmonary/Chest: Effort normal and breath sounds normal. No respiratory distress.   Abdominal: Soft. Bowel sounds are normal. There is no hepatosplenomegaly. There is no tenderness.   Musculoskeletal: He exhibits no edema.   Skin: Skin is warm and dry.   Nursing note and vitals  reviewed.      Assessment/Plan     Diagnoses and all orders for this visit:    Vertigo  -     Ambulatory Referral to ENT (Otolaryngology)    Other orders  -     Discontinue: meclizine (ANTIVERT) 25 MG tablet; Take 25 mg by mouth 3 (Three) Times a Day.    Refer to ENT

## 2019-07-03 RX ORDER — ATORVASTATIN CALCIUM 20 MG/1
TABLET, FILM COATED ORAL
Qty: 90 TABLET | Refills: 2 | Status: SHIPPED | OUTPATIENT
Start: 2019-07-03 | End: 2019-12-03 | Stop reason: SDUPTHER

## 2019-07-03 NOTE — TELEPHONE ENCOUNTER
----- Message from Angelique Carson sent at 7/3/2019 10:40 AM EDT -----  Contact: PT.  PT. SEE'S DR. RODRIGUEZ  PT. IS NEEDING A REFILL ON:  atorvastatin (LIPITOR) 20 MG tablet 90 tablet 0 4/1/2019    Sig: TAKE 1 TABLET BY MOUTH ONE TIME A DAY    Sent to pharmacy as: Atorvastatin Calcium 20 MG Oral Tablet     RX=[id number] - [place], [place] - [facility] [address] [place] [address] - [phone]  - [phone]  [phone] (Phone)  [phone] (Fax)    PT. CAN BE REACHED @ [facility].

## 2019-07-29 DIAGNOSIS — K21.9 GASTROESOPHAGEAL REFLUX DISEASE, ESOPHAGITIS PRESENCE NOT SPECIFIED: ICD-10-CM

## 2019-08-01 RX ORDER — LANSOPRAZOLE 30 MG/1
CAPSULE, DELAYED RELEASE ORAL
Qty: 90 CAPSULE | Refills: 2 | Status: SHIPPED | OUTPATIENT
Start: 2019-08-01 | End: 2020-04-27

## 2019-12-03 ENCOUNTER — OFFICE VISIT (OUTPATIENT)
Dept: FAMILY MEDICINE CLINIC | Facility: CLINIC | Age: 69
End: 2019-12-03

## 2019-12-03 VITALS
TEMPERATURE: 97.7 F | SYSTOLIC BLOOD PRESSURE: 124 MMHG | BODY MASS INDEX: 25.6 KG/M2 | OXYGEN SATURATION: 99 % | HEIGHT: 72 IN | WEIGHT: 189 LBS | HEART RATE: 76 BPM | DIASTOLIC BLOOD PRESSURE: 68 MMHG

## 2019-12-03 DIAGNOSIS — R03.0 ELEVATED BLOOD PRESSURE READING WITHOUT DIAGNOSIS OF HYPERTENSION: ICD-10-CM

## 2019-12-03 DIAGNOSIS — Z12.5 PROSTATE CANCER SCREENING: ICD-10-CM

## 2019-12-03 DIAGNOSIS — E78.2 MIXED HYPERLIPIDEMIA: ICD-10-CM

## 2019-12-03 DIAGNOSIS — K21.9 GASTROESOPHAGEAL REFLUX DISEASE, ESOPHAGITIS PRESENCE NOT SPECIFIED: ICD-10-CM

## 2019-12-03 DIAGNOSIS — Z00.00 ROUTINE GENERAL MEDICAL EXAMINATION AT A HEALTH CARE FACILITY: Primary | ICD-10-CM

## 2019-12-03 DIAGNOSIS — J31.0 CHRONIC RHINITIS: ICD-10-CM

## 2019-12-03 PROCEDURE — 99397 PER PM REEVAL EST PAT 65+ YR: CPT | Performed by: FAMILY MEDICINE

## 2019-12-03 RX ORDER — HEPATITIS A VACCINE 1440 [IU]/ML
INJECTION, SUSPENSION INTRAMUSCULAR
Refills: 0 | COMMUNITY
Start: 2019-10-06 | End: 2021-04-05 | Stop reason: HOSPADM

## 2019-12-03 RX ORDER — ATORVASTATIN CALCIUM 20 MG/1
20 TABLET, FILM COATED ORAL DAILY
Qty: 30 TABLET | Refills: 11 | Status: SHIPPED | OUTPATIENT
Start: 2019-12-03 | End: 2020-12-09 | Stop reason: SDUPTHER

## 2019-12-03 RX ORDER — CETIRIZINE HYDROCHLORIDE, PSEUDOEPHEDRINE HYDROCHLORIDE 5; 120 MG/1; MG/1
1 TABLET, FILM COATED, EXTENDED RELEASE ORAL 2 TIMES DAILY
Qty: 60 TABLET | Refills: 11 | Status: SHIPPED | OUTPATIENT
Start: 2019-12-03 | End: 2020-12-04

## 2019-12-03 RX ORDER — ZOSTER VACCINE RECOMBINANT, ADJUVANTED 50 MCG/0.5
KIT INTRAMUSCULAR
Refills: 1 | COMMUNITY
Start: 2019-10-06 | End: 2021-04-05 | Stop reason: HOSPADM

## 2019-12-03 RX ORDER — INFLUENZA VACCINE, ADJUVANTED 15; 15; 15 UG/.5ML; UG/.5ML; UG/.5ML
INJECTION, SUSPENSION INTRAMUSCULAR
Refills: 0 | COMMUNITY
Start: 2019-10-06 | End: 2021-04-05 | Stop reason: HOSPADM

## 2019-12-03 NOTE — PROGRESS NOTES
Subjective   Tucker Johnson is a 69 y.o. male    Chief Complaint    Annual physical exam  Prostate cancer screening  Hyperlipidemia  GERD  Chronic rhinitis    History of Present Illness  Patient presents for annual physical examination and prostate cancer screening.  Other chronic issues that are stable include hyperlipidemia and GERD.  Chronic issue that is not stable is chronic rhinitis which used to be quite responsive to 12-hour Sudafed but the Sudafed does not seem to be controlling the symptoms presently.  He is asking if there is anything else we could try.  I have recommended trying a nonsedating antihistamine plus a 12-hour Sudafed combination.  Patient is not fasting today for lab studies but will return in the near future.  Health maintenance issues including immunizations are reviewed.    The following portions of the patient's history were reviewed and updated as appropriate: allergies, current medications, past social history and problem list    Review of Systems   Constitutional: Negative.  Negative for chills, fatigue and fever.   HENT: Negative.  Negative for ear pain, hearing loss, sinus pressure and trouble swallowing.    Eyes: Negative.    Respiratory: Negative.  Negative for cough, chest tightness, shortness of breath and wheezing.    Cardiovascular: Negative.  Negative for chest pain, palpitations and leg swelling.   Gastrointestinal: Negative.  Negative for abdominal pain, nausea and vomiting.   Endocrine: Negative.  Negative for polydipsia, polyphagia and polyuria.   Genitourinary: Negative.  Negative for dysuria, frequency and urgency.   Musculoskeletal: Negative.  Negative for arthralgias, back pain and myalgias.   Skin: Negative.  Negative for color change and rash.   Allergic/Immunologic: Negative.    Neurological: Negative.  Negative for weakness and headaches.   Hematological: Negative.  Negative for adenopathy. Does not bruise/bleed easily.   Psychiatric/Behavioral: Negative.    All  other systems reviewed and are negative.      Objective     Vitals:    12/03/19 1341   BP: 124/68   Pulse: 76   Temp: 97.7 °F (36.5 °C)   SpO2: 99%       Physical Exam   Constitutional: He is oriented to person, place, and time. He appears well-developed and well-nourished.   HENT:   Head: Normocephalic and atraumatic.   Right Ear: External ear normal.   Left Ear: External ear normal.   Nose: Nose normal.   Mouth/Throat: Oropharynx is clear and moist.   Eyes: Conjunctivae and EOM are normal. Pupils are equal, round, and reactive to light.   Neck: Normal range of motion. Neck supple. No thyromegaly present.   Cardiovascular: Normal rate, regular rhythm, normal heart sounds and intact distal pulses.   No murmur heard.  Pulmonary/Chest: Effort normal and breath sounds normal.   Abdominal: Soft. Bowel sounds are normal. He exhibits no mass. There is no tenderness.   Genitourinary: Rectum normal, prostate normal and penis normal.   Musculoskeletal: Normal range of motion. He exhibits no edema.   Lymphadenopathy:     He has no cervical adenopathy.   Neurological: He is alert and oriented to person, place, and time. He has normal reflexes. No cranial nerve deficit.   Skin: Skin is warm and dry. No rash noted.   Psychiatric: He has a normal mood and affect. His behavior is normal.   Nursing note and vitals reviewed.      Assessment/Plan   Problem List Items Addressed This Visit     None      Visit Diagnoses     Routine general medical examination at a health care facility    -  Primary    Relevant Orders    CBC (No Diff)    Comprehensive Metabolic Panel    Lipid Panel    PSA Screen    Prostate cancer screening        Relevant Orders    PSA Screen    Mixed hyperlipidemia        Relevant Medications    atorvastatin (LIPITOR) 20 MG tablet    Other Relevant Orders    Comprehensive Metabolic Panel    Lipid Panel    Chronic rhinitis        Relevant Medications    cetirizine-pseudoephedrine (ZyrTEC-D) 5-120 MG per 12 hr tablet     Other Relevant Orders    CBC (No Diff)    Comprehensive Metabolic Panel    Gastroesophageal reflux disease, esophagitis presence not specified        Relevant Orders    CBC (No Diff)    Comprehensive Metabolic Panel          Patient is counseled during today's visit regarding preventative health measures to include use of seatbelts, medication safety, healthy diet and regular exercise.

## 2019-12-09 ENCOUNTER — LAB (OUTPATIENT)
Dept: LAB | Facility: HOSPITAL | Age: 69
End: 2019-12-09

## 2019-12-09 DIAGNOSIS — Z12.5 PROSTATE CANCER SCREENING: ICD-10-CM

## 2019-12-09 DIAGNOSIS — K21.9 GASTROESOPHAGEAL REFLUX DISEASE, ESOPHAGITIS PRESENCE NOT SPECIFIED: ICD-10-CM

## 2019-12-09 DIAGNOSIS — E78.2 MIXED HYPERLIPIDEMIA: ICD-10-CM

## 2019-12-09 DIAGNOSIS — J31.0 CHRONIC RHINITIS: ICD-10-CM

## 2019-12-09 DIAGNOSIS — Z00.00 ROUTINE GENERAL MEDICAL EXAMINATION AT A HEALTH CARE FACILITY: ICD-10-CM

## 2019-12-09 DIAGNOSIS — R03.0 ELEVATED BLOOD PRESSURE READING WITHOUT DIAGNOSIS OF HYPERTENSION: ICD-10-CM

## 2019-12-09 LAB
ALBUMIN SERPL-MCNC: 4.5 G/DL (ref 3.5–5.2)
ALBUMIN/GLOB SERPL: 2.1 G/DL
ALP SERPL-CCNC: 46 U/L (ref 39–117)
ALT SERPL W P-5'-P-CCNC: 34 U/L (ref 1–41)
ANION GAP SERPL CALCULATED.3IONS-SCNC: 10.8 MMOL/L (ref 5–15)
AST SERPL-CCNC: 31 U/L (ref 1–40)
BILIRUB SERPL-MCNC: 3 MG/DL (ref 0.2–1.2)
BUN BLD-MCNC: 14 MG/DL (ref 8–23)
BUN/CREAT SERPL: 14 (ref 7–25)
CALCIUM SPEC-SCNC: 9.1 MG/DL (ref 8.6–10.5)
CHLORIDE SERPL-SCNC: 103 MMOL/L (ref 98–107)
CHOLEST SERPL-MCNC: 141 MG/DL (ref 0–200)
CO2 SERPL-SCNC: 29.2 MMOL/L (ref 22–29)
CREAT BLD-MCNC: 1 MG/DL (ref 0.76–1.27)
DEPRECATED RDW RBC AUTO: 44 FL (ref 37–54)
ERYTHROCYTE [DISTWIDTH] IN BLOOD BY AUTOMATED COUNT: 12.3 % (ref 12.3–15.4)
GFR SERPL CREATININE-BSD FRML MDRD: 74 ML/MIN/1.73
GLOBULIN UR ELPH-MCNC: 2.1 GM/DL
GLUCOSE BLD-MCNC: 103 MG/DL (ref 65–99)
HCT VFR BLD AUTO: 42.8 % (ref 37.5–51)
HDLC SERPL-MCNC: 70 MG/DL (ref 40–60)
HGB BLD-MCNC: 15 G/DL (ref 13–17.7)
LDLC SERPL CALC-MCNC: 45 MG/DL (ref 0–100)
LDLC/HDLC SERPL: 0.64 {RATIO}
MCH RBC QN AUTO: 33.7 PG (ref 26.6–33)
MCHC RBC AUTO-ENTMCNC: 35 G/DL (ref 31.5–35.7)
MCV RBC AUTO: 96.2 FL (ref 79–97)
PLATELET # BLD AUTO: 75 10*3/MM3 (ref 140–450)
PMV BLD AUTO: 11 FL (ref 6–12)
POTASSIUM BLD-SCNC: 4.6 MMOL/L (ref 3.5–5.2)
PROT SERPL-MCNC: 6.6 G/DL (ref 6–8.5)
PSA SERPL-MCNC: 0.37 NG/ML (ref 0–4)
RBC # BLD AUTO: 4.45 10*6/MM3 (ref 4.14–5.8)
SODIUM BLD-SCNC: 143 MMOL/L (ref 136–145)
TRIGL SERPL-MCNC: 130 MG/DL (ref 0–150)
VLDLC SERPL-MCNC: 26 MG/DL (ref 5–40)
WBC NRBC COR # BLD: 4.97 10*3/MM3 (ref 3.4–10.8)

## 2019-12-09 PROCEDURE — G0103 PSA SCREENING: HCPCS

## 2019-12-09 PROCEDURE — 85027 COMPLETE CBC AUTOMATED: CPT

## 2019-12-09 PROCEDURE — 36415 COLL VENOUS BLD VENIPUNCTURE: CPT

## 2019-12-09 PROCEDURE — 80053 COMPREHEN METABOLIC PANEL: CPT

## 2019-12-09 PROCEDURE — 80061 LIPID PANEL: CPT

## 2020-04-27 DIAGNOSIS — K21.9 GASTROESOPHAGEAL REFLUX DISEASE, ESOPHAGITIS PRESENCE NOT SPECIFIED: ICD-10-CM

## 2020-04-27 RX ORDER — LANSOPRAZOLE 30 MG/1
CAPSULE, DELAYED RELEASE ORAL
Qty: 90 CAPSULE | Refills: 3 | Status: SHIPPED | OUTPATIENT
Start: 2020-04-27 | End: 2020-12-09 | Stop reason: SDUPTHER

## 2020-09-25 ENCOUNTER — TELEPHONE (OUTPATIENT)
Dept: FAMILY MEDICINE CLINIC | Facility: CLINIC | Age: 70
End: 2020-09-25

## 2020-09-25 NOTE — TELEPHONE ENCOUNTER
PATIENT SCHEDULED YEARLY PHYSICAL 12/09. PATIENT STATES DR HARTMAN IS HIS PRIMARY NOT DELMI AGUILAR.  I CHECKED AND PHYSICAL APPOINTMENTS HAVE BEEN WITH DR HARTMAN.     PATIENT CALL BACK    [phone]

## 2020-11-16 PROCEDURE — U0003 INFECTIOUS AGENT DETECTION BY NUCLEIC ACID (DNA OR RNA); SEVERE ACUTE RESPIRATORY SYNDROME CORONAVIRUS 2 (SARS-COV-2) (CORONAVIRUS DISEASE [COVID-19]), AMPLIFIED PROBE TECHNIQUE, MAKING USE OF HIGH THROUGHPUT TECHNOLOGIES AS DESCRIBED BY CMS-2020-01-R: HCPCS | Performed by: PHYSICIAN ASSISTANT

## 2020-12-04 DIAGNOSIS — J31.0 CHRONIC RHINITIS: ICD-10-CM

## 2020-12-04 RX ORDER — CETIRIZINE HCL/PSEUDOEPHEDRINE 5 MG-120MG
TABLET, EXTENDED RELEASE 12 HR ORAL
Qty: 60 TABLET | Refills: 11 | Status: SHIPPED | OUTPATIENT
Start: 2020-12-04 | End: 2022-01-31

## 2020-12-09 ENCOUNTER — OFFICE VISIT (OUTPATIENT)
Dept: FAMILY MEDICINE CLINIC | Facility: CLINIC | Age: 70
End: 2020-12-09

## 2020-12-09 VITALS
BODY MASS INDEX: 26.82 KG/M2 | WEIGHT: 198 LBS | SYSTOLIC BLOOD PRESSURE: 138 MMHG | DIASTOLIC BLOOD PRESSURE: 90 MMHG | OXYGEN SATURATION: 99 % | HEART RATE: 78 BPM | TEMPERATURE: 97.9 F | HEIGHT: 72 IN

## 2020-12-09 DIAGNOSIS — M65.322 TRIGGER INDEX FINGER OF LEFT HAND: ICD-10-CM

## 2020-12-09 DIAGNOSIS — Z00.00 ROUTINE GENERAL MEDICAL EXAMINATION AT A HEALTH CARE FACILITY: Primary | ICD-10-CM

## 2020-12-09 DIAGNOSIS — K21.9 GASTROESOPHAGEAL REFLUX DISEASE, UNSPECIFIED WHETHER ESOPHAGITIS PRESENT: ICD-10-CM

## 2020-12-09 DIAGNOSIS — E78.2 MIXED HYPERLIPIDEMIA: ICD-10-CM

## 2020-12-09 DIAGNOSIS — J31.0 CHRONIC RHINITIS: ICD-10-CM

## 2020-12-09 DIAGNOSIS — M65.351 TRIGGER LITTLE FINGER OF RIGHT HAND: ICD-10-CM

## 2020-12-09 DIAGNOSIS — R03.0 ELEVATED BLOOD PRESSURE READING WITHOUT DIAGNOSIS OF HYPERTENSION: ICD-10-CM

## 2020-12-09 DIAGNOSIS — Z12.5 PROSTATE CANCER SCREENING: ICD-10-CM

## 2020-12-09 PROCEDURE — 99397 PER PM REEVAL EST PAT 65+ YR: CPT | Performed by: FAMILY MEDICINE

## 2020-12-09 RX ORDER — ATORVASTATIN CALCIUM 20 MG/1
20 TABLET, FILM COATED ORAL DAILY
Qty: 30 TABLET | Refills: 11 | Status: SHIPPED | OUTPATIENT
Start: 2020-12-09 | End: 2021-12-22

## 2020-12-09 RX ORDER — LANSOPRAZOLE 30 MG/1
30 CAPSULE, DELAYED RELEASE ORAL DAILY
Qty: 90 CAPSULE | Refills: 3 | Status: SHIPPED | OUTPATIENT
Start: 2020-12-09 | End: 2021-04-22

## 2020-12-14 ENCOUNTER — LAB (OUTPATIENT)
Dept: LAB | Facility: HOSPITAL | Age: 70
End: 2020-12-14

## 2020-12-14 DIAGNOSIS — R03.0 ELEVATED BLOOD PRESSURE READING WITHOUT DIAGNOSIS OF HYPERTENSION: ICD-10-CM

## 2020-12-14 DIAGNOSIS — K21.9 GASTROESOPHAGEAL REFLUX DISEASE, UNSPECIFIED WHETHER ESOPHAGITIS PRESENT: ICD-10-CM

## 2020-12-14 DIAGNOSIS — E78.2 MIXED HYPERLIPIDEMIA: ICD-10-CM

## 2020-12-14 DIAGNOSIS — Z00.00 ROUTINE GENERAL MEDICAL EXAMINATION AT A HEALTH CARE FACILITY: ICD-10-CM

## 2020-12-14 DIAGNOSIS — J31.0 CHRONIC RHINITIS: ICD-10-CM

## 2020-12-14 DIAGNOSIS — Z12.5 PROSTATE CANCER SCREENING: ICD-10-CM

## 2020-12-14 LAB
ALBUMIN SERPL-MCNC: 4.3 G/DL (ref 3.5–5.2)
ALBUMIN/GLOB SERPL: 2.4 G/DL
ALP SERPL-CCNC: 47 U/L (ref 39–117)
ALT SERPL W P-5'-P-CCNC: 53 U/L (ref 1–41)
ANION GAP SERPL CALCULATED.3IONS-SCNC: 6.7 MMOL/L (ref 5–15)
AST SERPL-CCNC: 39 U/L (ref 1–40)
BILIRUB SERPL-MCNC: 2.1 MG/DL (ref 0–1.2)
BUN SERPL-MCNC: 16 MG/DL (ref 8–23)
BUN/CREAT SERPL: 16.7 (ref 7–25)
CALCIUM SPEC-SCNC: 8.7 MG/DL (ref 8.6–10.5)
CHLORIDE SERPL-SCNC: 104 MMOL/L (ref 98–107)
CHOLEST SERPL-MCNC: 121 MG/DL (ref 0–200)
CO2 SERPL-SCNC: 29.3 MMOL/L (ref 22–29)
CREAT SERPL-MCNC: 0.96 MG/DL (ref 0.76–1.27)
DEPRECATED RDW RBC AUTO: 43.4 FL (ref 37–54)
ERYTHROCYTE [DISTWIDTH] IN BLOOD BY AUTOMATED COUNT: 12.6 % (ref 12.3–15.4)
GFR SERPL CREATININE-BSD FRML MDRD: 77 ML/MIN/1.73
GLOBULIN UR ELPH-MCNC: 1.8 GM/DL
GLUCOSE SERPL-MCNC: 99 MG/DL (ref 65–99)
HCT VFR BLD AUTO: 41.8 % (ref 37.5–51)
HDLC SERPL-MCNC: 68 MG/DL (ref 40–60)
HGB BLD-MCNC: 14.9 G/DL (ref 13–17.7)
LDLC SERPL CALC-MCNC: 38 MG/DL (ref 0–100)
LDLC/HDLC SERPL: 0.55 {RATIO}
MCH RBC QN AUTO: 33.8 PG (ref 26.6–33)
MCHC RBC AUTO-ENTMCNC: 35.6 G/DL (ref 31.5–35.7)
MCV RBC AUTO: 94.8 FL (ref 79–97)
PLATELET # BLD AUTO: 59 10*3/MM3 (ref 140–450)
PMV BLD AUTO: 11.5 FL (ref 6–12)
POTASSIUM SERPL-SCNC: 4 MMOL/L (ref 3.5–5.2)
PROT SERPL-MCNC: 6.1 G/DL (ref 6–8.5)
PSA SERPL-MCNC: 0.3 NG/ML (ref 0–4)
RBC # BLD AUTO: 4.41 10*6/MM3 (ref 4.14–5.8)
SODIUM SERPL-SCNC: 140 MMOL/L (ref 136–145)
T4 FREE SERPL-MCNC: 0.85 NG/DL (ref 0.93–1.7)
TRIGL SERPL-MCNC: 78 MG/DL (ref 0–150)
TSH SERPL DL<=0.05 MIU/L-ACNC: 4.48 UIU/ML (ref 0.27–4.2)
VLDLC SERPL-MCNC: 15 MG/DL (ref 5–40)
WBC # BLD AUTO: 2.93 10*3/MM3 (ref 3.4–10.8)

## 2020-12-14 PROCEDURE — 84439 ASSAY OF FREE THYROXINE: CPT

## 2020-12-14 PROCEDURE — 85027 COMPLETE CBC AUTOMATED: CPT

## 2020-12-14 PROCEDURE — 36415 COLL VENOUS BLD VENIPUNCTURE: CPT

## 2020-12-14 PROCEDURE — G0103 PSA SCREENING: HCPCS

## 2020-12-14 PROCEDURE — 80053 COMPREHEN METABOLIC PANEL: CPT

## 2020-12-14 PROCEDURE — 80061 LIPID PANEL: CPT

## 2020-12-14 PROCEDURE — 84443 ASSAY THYROID STIM HORMONE: CPT

## 2020-12-19 NOTE — PROGRESS NOTES
Subjective   Tucker Johnson is a 70 y.o. male    Chief Complaint    Annual physical examination  Prostate cancer screening  Hyperlipidemia  Hypertension  GERD  Trigger finger    History of Present Illness  Patient presents today for his annual physical examination with multiple other concerns and complaints.  He is due for prostate cancer screening and follow-up regarding his hyperlipidemia and hypertension.  He also has a history of gastroesophageal reflux disease.  Is complaining of multiple issues associated with trigger finger involving digits of both hands.  He also wants to discuss diet therapy as he would like to lose some weight control is diet somewhat.  We discussed this extensively with recommendation of low carbohydrate, [place] group type diet.    The following portions of the patient's history were reviewed and updated as appropriate: allergies, current medications, past social history and problem list    Review of Systems   Constitutional: Negative.  Negative for chills, fatigue, fever and unexpected weight change.   HENT: Negative.    Eyes: Negative.    Respiratory: Negative.  Negative for cough, chest tightness, shortness of breath and wheezing.    Cardiovascular: Negative.  Negative for chest pain, palpitations and leg swelling.   Gastrointestinal: Negative.  Negative for abdominal pain, nausea and vomiting.   Endocrine: Negative.  Negative for polydipsia, polyphagia and polyuria.   Genitourinary: Negative.  Negative for dysuria, frequency and urgency.   Musculoskeletal: Negative.  Negative for arthralgias, back pain and myalgias.   Skin: Negative.  Negative for color change and rash.   Allergic/Immunologic: Negative.    Neurological: Negative.  Negative for dizziness, syncope, weakness and headaches.   Hematological: Negative.  Negative for adenopathy. Does not bruise/bleed easily.   Psychiatric/Behavioral: Negative.    All other systems reviewed and are negative.      Objective     Vitals:     12/09/20 1408   BP: 138/90   Pulse: 78   Temp: 97.9 °F (36.6 °C)   SpO2: 99%       Physical Exam  Vitals signs and nursing note reviewed.   Constitutional:       Appearance: He is well-developed.   HENT:      Head: Normocephalic and atraumatic.      Right Ear: External ear normal.      Left Ear: External ear normal.      Nose: Nose normal.   Eyes:      Conjunctiva/sclera: Conjunctivae normal.      Pupils: Pupils are equal, round, and reactive to light.   Neck:      Musculoskeletal: Normal range of motion and neck supple.      Thyroid: No thyromegaly.      Vascular: No JVD.   Cardiovascular:      Rate and Rhythm: Normal rate and regular rhythm.      Pulses: Normal pulses.      Heart sounds: Normal heart sounds. No murmur.   Pulmonary:      Effort: Pulmonary effort is normal. No respiratory distress.      Breath sounds: Normal breath sounds.   Abdominal:      General: Bowel sounds are normal.      Palpations: Abdomen is soft. There is no mass.      Tenderness: There is no abdominal tenderness.   Genitourinary:     Penis: Normal.       Prostate: Normal.      Rectum: Normal.   Musculoskeletal: Normal range of motion.         General: No tenderness or signs of injury.      Comments: Trigger finger deformities bilateral hands with catching and locking.   Lymphadenopathy:      Cervical: No cervical adenopathy.   Skin:     General: Skin is warm and dry.      Findings: No rash.   Neurological:      General: No focal deficit present.      Mental Status: He is alert and oriented to person, place, and time.      Cranial Nerves: No cranial nerve deficit.      Deep Tendon Reflexes: Reflexes are normal and symmetric.   Psychiatric:         Mood and Affect: Mood normal.         Behavior: Behavior normal.         Assessment/Plan   Problems Addressed this Visit     None      Visit Diagnoses     Routine general medical examination at a health care facility    -  Primary    Relevant Orders    CBC (No Diff) (Completed)    Comprehensive  Metabolic Panel (Completed)    Lipid Panel (Completed)    TSH (Completed)    T4, Free (Completed)    Mixed hyperlipidemia        Relevant Medications    atorvastatin (LIPITOR) 20 MG tablet    Other Relevant Orders    Comprehensive Metabolic Panel (Completed)    Lipid Panel (Completed)    Gastroesophageal reflux disease, unspecified whether esophagitis present        Relevant Medications    lansoprazole (PREVACID) 30 MG capsule    Other Relevant Orders    CBC (No Diff) (Completed)    Comprehensive Metabolic Panel (Completed)    Prostate cancer screening        Relevant Orders    PSA Screen (Completed)    Elevated blood pressure reading without diagnosis of hypertension        Relevant Orders    Comprehensive Metabolic Panel (Completed)    TSH (Completed)    T4, Free (Completed)    Chronic rhinitis        Relevant Orders    CBC (No Diff) (Completed)    Comprehensive Metabolic Panel (Completed)    Trigger index finger of left hand        Relevant Orders    Ambulatory Referral to Hand Surgery (Completed)    Trigger little finger of right hand        Relevant Orders    Ambulatory Referral to Hand Surgery (Completed)      Diagnoses       Codes Comments    Routine general medical examination at a health care facility    -  Primary ICD-10-CM: Z00.00  ICD-9-CM: V70.0     Mixed hyperlipidemia     ICD-10-CM: E78.2  ICD-9-CM: 272.2     Gastroesophageal reflux disease, unspecified whether esophagitis present     ICD-10-CM: K21.9  ICD-9-CM: 530.81     Prostate cancer screening     ICD-10-CM: Z12.5  ICD-9-CM: V76.44     Elevated blood pressure reading without diagnosis of hypertension     ICD-10-CM: R03.0  ICD-9-CM: 796.2     Chronic rhinitis     ICD-10-CM: J31.0  ICD-9-CM: 472.0     Trigger index finger of left hand     ICD-10-CM: M65.322  ICD-9-CM: 727.03     Trigger little finger of right hand     ICD-10-CM: M65.351  ICD-9-CM: 727.03

## 2021-01-15 ENCOUNTER — TELEPHONE (OUTPATIENT)
Dept: FAMILY MEDICINE CLINIC | Facility: CLINIC | Age: 71
End: 2021-01-15

## 2021-01-15 NOTE — TELEPHONE ENCOUNTER
Spoke with patient and sent his letter out to him again.  He stated he never received it on 12/20/20

## 2021-01-20 ENCOUNTER — TELEPHONE (OUTPATIENT)
Dept: FAMILY MEDICINE CLINIC | Facility: CLINIC | Age: 71
End: 2021-01-20

## 2021-01-20 NOTE — TELEPHONE ENCOUNTER
"PATIENT IS CALLING WANTING TO TALK WITH DR. HARTMAN .  PATIENT WANTS AN EXPLANATION OF HIS LAB WORK.  HE WANTS TO KNOW WHAT THE \"HIGHS AND LOWS\" MEAN.        "

## 2021-02-17 ENCOUNTER — IMMUNIZATION (OUTPATIENT)
Dept: VACCINE CLINIC | Facility: HOSPITAL | Age: 71
End: 2021-02-17

## 2021-02-17 PROCEDURE — 91300 HC SARSCOV02 VAC 30MCG/0.3ML IM: CPT | Performed by: INTERNAL MEDICINE

## 2021-02-17 PROCEDURE — 0001A: CPT | Performed by: INTERNAL MEDICINE

## 2021-03-09 ENCOUNTER — LAB (OUTPATIENT)
Dept: LAB | Facility: HOSPITAL | Age: 71
End: 2021-03-09

## 2021-03-09 ENCOUNTER — OFFICE VISIT (OUTPATIENT)
Dept: FAMILY MEDICINE CLINIC | Facility: CLINIC | Age: 71
End: 2021-03-09

## 2021-03-09 VITALS
OXYGEN SATURATION: 99 % | RESPIRATION RATE: 16 BRPM | WEIGHT: 186 LBS | HEART RATE: 79 BPM | SYSTOLIC BLOOD PRESSURE: 140 MMHG | DIASTOLIC BLOOD PRESSURE: 84 MMHG | BODY MASS INDEX: 25.19 KG/M2 | HEIGHT: 72 IN

## 2021-03-09 DIAGNOSIS — D69.6 THROMBOCYTOPENIA, ACQUIRED (HCC): ICD-10-CM

## 2021-03-09 DIAGNOSIS — R79.89 ELEVATED TSH: Primary | ICD-10-CM

## 2021-03-09 DIAGNOSIS — R79.89 ELEVATED TSH: ICD-10-CM

## 2021-03-09 LAB
DEPRECATED RDW RBC AUTO: 42.3 FL (ref 37–54)
ERYTHROCYTE [DISTWIDTH] IN BLOOD BY AUTOMATED COUNT: 11.8 % (ref 12.3–15.4)
HCT VFR BLD AUTO: 47.1 % (ref 37.5–51)
HGB BLD-MCNC: 16 G/DL (ref 13–17.7)
MCH RBC QN AUTO: 33.2 PG (ref 26.6–33)
MCHC RBC AUTO-ENTMCNC: 34 G/DL (ref 31.5–35.7)
MCV RBC AUTO: 97.7 FL (ref 79–97)
PLATELET # BLD AUTO: 84 10*3/MM3 (ref 140–450)
PMV BLD AUTO: 10.7 FL (ref 6–12)
RBC # BLD AUTO: 4.82 10*6/MM3 (ref 4.14–5.8)
T4 FREE SERPL-MCNC: 0.96 NG/DL (ref 0.93–1.7)
TSH SERPL DL<=0.05 MIU/L-ACNC: 3.27 UIU/ML (ref 0.27–4.2)
WBC # BLD AUTO: 3.96 10*3/MM3 (ref 3.4–10.8)

## 2021-03-09 PROCEDURE — 36415 COLL VENOUS BLD VENIPUNCTURE: CPT

## 2021-03-09 PROCEDURE — 99213 OFFICE O/P EST LOW 20 MIN: CPT | Performed by: FAMILY MEDICINE

## 2021-03-09 PROCEDURE — 84443 ASSAY THYROID STIM HORMONE: CPT

## 2021-03-09 PROCEDURE — 84439 ASSAY OF FREE THYROXINE: CPT

## 2021-03-09 PROCEDURE — 85027 COMPLETE CBC AUTOMATED: CPT

## 2021-03-09 NOTE — PROGRESS NOTES
Subjective   Tucker Johnson is a 70 y.o. male    Chief Complaint    Abnormal labs  Overweight    History of Present Illness  The patient had his physical 3 months ago and some of his lab results were abnormal, including a low platelet count of 59,000, and a low white blood cell count of 2.93. His thyroid levels also showed a slightly elevated TSH and a slightly low free T4 level. He is here today to have those labs rechecked.     The patient has recently been successful in losing weight, and attributes it to watching his diet.     He is followed by Dr. Matthew for his finger. The cortisone injection relieved the pain for a while, but it has since returned to a constant pain with occasional locking.     The following portions of the patient's history were reviewed and updated as appropriate: allergies, current medications, past social history and problem list    Review of Systems   Constitutional: Negative for fatigue and unexpected weight change.   Eyes: Negative for visual disturbance.   Cardiovascular: Negative for chest pain and palpitations.   Gastrointestinal: Negative for constipation and diarrhea.   Endocrine: Negative for cold intolerance and heat intolerance.   Neurological: Negative for tremors.   Psychiatric/Behavioral: Negative for agitation. The patient is not nervous/anxious.        Objective     Vitals:    03/09/21 0832   BP: 140/84   Pulse: 79   Resp: 16   SpO2: 99%       Physical Exam  Vitals and nursing note reviewed.   Constitutional:       Appearance: He is well-developed.   HENT:      Head: Normocephalic.   Neck:      Thyroid: No thyromegaly.      Vascular: No JVD.   Cardiovascular:      Rate and Rhythm: Normal rate and regular rhythm.   Lymphadenopathy:      Cervical: No cervical adenopathy.   Skin:     General: Skin is warm and dry.         Assessment/Plan   Problems Addressed this Visit     None      Visit Diagnoses     Elevated TSH    -  Primary    Relevant Orders    TSH    T4, Free     Thrombocytopenia, acquired (CMS/HCC)        Relevant Orders    CBC (No Diff)      Diagnoses       Codes Comments    Elevated TSH    -  Primary ICD-10-CM: R79.89  ICD-9-CM: 794.5     Thrombocytopenia, acquired (CMS/HCC)     ICD-10-CM: D69.6  ICD-9-CM: 287.5         Continue with diet    Scribed for SUSANNE Davis MD by Marilia Medley.  03/09/21   11:10 EST    I have personally performed the services described in this document as scribed by the above individual, and it is both accurate and complete.  SUSANNE Davis MD  3/9/2021      12:44 ESTAnswers for HPI/ROS submitted by the patient on 3/7/2021  What is the primary reason for your visit?: Other  Please describe your symptoms.: Follow up on weight loss program.  Have you had these symptoms before?: No  How long have you been having these symptoms?: Greater than 2 weeks

## 2021-03-10 ENCOUNTER — IMMUNIZATION (OUTPATIENT)
Dept: VACCINE CLINIC | Facility: HOSPITAL | Age: 71
End: 2021-03-10

## 2021-03-10 PROCEDURE — 91300 HC SARSCOV02 VAC 30MCG/0.3ML IM: CPT | Performed by: INTERNAL MEDICINE

## 2021-03-10 PROCEDURE — 0002A: CPT | Performed by: INTERNAL MEDICINE

## 2021-04-04 ENCOUNTER — APPOINTMENT (OUTPATIENT)
Dept: CT IMAGING | Facility: HOSPITAL | Age: 71
End: 2021-04-04

## 2021-04-04 ENCOUNTER — APPOINTMENT (OUTPATIENT)
Dept: GENERAL RADIOLOGY | Facility: HOSPITAL | Age: 71
End: 2021-04-04

## 2021-04-04 ENCOUNTER — APPOINTMENT (OUTPATIENT)
Dept: MRI IMAGING | Facility: HOSPITAL | Age: 71
End: 2021-04-04

## 2021-04-04 ENCOUNTER — HOSPITAL ENCOUNTER (OUTPATIENT)
Facility: HOSPITAL | Age: 71
Setting detail: OBSERVATION
Discharge: HOME | End: 2021-04-05
Attending: EMERGENCY MEDICINE | Admitting: HOSPITALIST

## 2021-04-04 DIAGNOSIS — Z74.09 IMPAIRED MOBILITY AND ADLS: ICD-10-CM

## 2021-04-04 DIAGNOSIS — Z78.9 IMPAIRED MOBILITY AND ADLS: ICD-10-CM

## 2021-04-04 DIAGNOSIS — R42 VERTIGO: Primary | ICD-10-CM

## 2021-04-04 LAB
ALBUMIN SERPL-MCNC: 4.5 G/DL (ref 3.5–5.2)
ALBUMIN/GLOB SERPL: 2.4 G/DL
ALP SERPL-CCNC: 48 U/L (ref 39–117)
ALT SERPL W P-5'-P-CCNC: 32 U/L (ref 1–41)
ANION GAP SERPL CALCULATED.3IONS-SCNC: 16 MMOL/L (ref 5–15)
AST SERPL-CCNC: 28 U/L (ref 1–40)
BASOPHILS # BLD AUTO: 0.01 10*3/MM3 (ref 0–0.2)
BASOPHILS NFR BLD AUTO: 0.2 % (ref 0–1.5)
BILIRUB SERPL-MCNC: 2.3 MG/DL (ref 0–1.2)
BILIRUB UR QL STRIP: NEGATIVE
BUN SERPL-MCNC: 15 MG/DL (ref 8–23)
BUN/CREAT SERPL: 14.9 (ref 7–25)
CALCIUM SPEC-SCNC: 9.3 MG/DL (ref 8.6–10.5)
CHLORIDE SERPL-SCNC: 102 MMOL/L (ref 98–107)
CLARITY UR: CLEAR
CO2 SERPL-SCNC: 20 MMOL/L (ref 22–29)
COLOR UR: YELLOW
CREAT SERPL-MCNC: 1.01 MG/DL (ref 0.76–1.27)
DEPRECATED RDW RBC AUTO: 37.9 FL (ref 37–54)
EOSINOPHIL # BLD AUTO: 0.08 10*3/MM3 (ref 0–0.4)
EOSINOPHIL NFR BLD AUTO: 1.6 % (ref 0.3–6.2)
ERYTHROCYTE [DISTWIDTH] IN BLOOD BY AUTOMATED COUNT: 11.7 % (ref 12.3–15.4)
ETHANOL BLD-MCNC: <10 MG/DL (ref 0–10)
FLUAV RNA RESP QL NAA+PROBE: NOT DETECTED
FLUBV RNA RESP QL NAA+PROBE: NOT DETECTED
GFR SERPL CREATININE-BSD FRML MDRD: 72 ML/MIN/1.73
GLOBULIN UR ELPH-MCNC: 1.9 GM/DL
GLUCOSE BLDC GLUCOMTR-MCNC: 104 MG/DL (ref 70–130)
GLUCOSE SERPL-MCNC: 166 MG/DL (ref 65–99)
GLUCOSE UR STRIP-MCNC: NEGATIVE MG/DL
HCT VFR BLD AUTO: 41.5 % (ref 37.5–51)
HGB BLD-MCNC: 15.4 G/DL (ref 13–17.7)
HGB UR QL STRIP.AUTO: NEGATIVE
HOLD SPECIMEN: NORMAL
IMM GRANULOCYTES # BLD AUTO: 0.05 10*3/MM3 (ref 0–0.05)
IMM GRANULOCYTES NFR BLD AUTO: 1 % (ref 0–0.5)
KETONES UR QL STRIP: ABNORMAL
LEUKOCYTE ESTERASE UR QL STRIP.AUTO: NEGATIVE
LIPASE SERPL-CCNC: 20 U/L (ref 13–60)
LYMPHOCYTES # BLD AUTO: 1.78 10*3/MM3 (ref 0.7–3.1)
LYMPHOCYTES NFR BLD AUTO: 35.2 % (ref 19.6–45.3)
MAGNESIUM SERPL-MCNC: 1.6 MG/DL (ref 1.6–2.4)
MCH RBC QN AUTO: 32.6 PG (ref 26.6–33)
MCHC RBC AUTO-ENTMCNC: 37.1 G/DL (ref 31.5–35.7)
MCV RBC AUTO: 87.9 FL (ref 79–97)
MONOCYTES # BLD AUTO: 0.56 10*3/MM3 (ref 0.1–0.9)
MONOCYTES NFR BLD AUTO: 11.1 % (ref 5–12)
NEUTROPHILS NFR BLD AUTO: 2.57 10*3/MM3 (ref 1.7–7)
NEUTROPHILS NFR BLD AUTO: 50.9 % (ref 42.7–76)
NITRITE UR QL STRIP: NEGATIVE
NRBC BLD AUTO-RTO: 0 /100 WBC (ref 0–0.2)
NT-PROBNP SERPL-MCNC: 164.9 PG/ML (ref 0–900)
PH UR STRIP.AUTO: >=9 [PH] (ref 5–8)
PLATELET # BLD AUTO: 78 10*3/MM3 (ref 140–450)
PMV BLD AUTO: 10 FL (ref 6–12)
POTASSIUM SERPL-SCNC: 3.4 MMOL/L (ref 3.5–5.2)
PROT SERPL-MCNC: 6.4 G/DL (ref 6–8.5)
PROT UR QL STRIP: NEGATIVE
QT INTERVAL: 384 MS
QT INTERVAL: 402 MS
QTC INTERVAL: 456 MS
QTC INTERVAL: 486 MS
RBC # BLD AUTO: 4.72 10*6/MM3 (ref 4.14–5.8)
SARS-COV-2 RNA RESP QL NAA+PROBE: NOT DETECTED
SODIUM SERPL-SCNC: 138 MMOL/L (ref 136–145)
SP GR UR STRIP: 1.01 (ref 1–1.03)
TROPONIN T SERPL-MCNC: <0.01 NG/ML (ref 0–0.03)
TROPONIN T SERPL-MCNC: <0.01 NG/ML (ref 0–0.03)
UROBILINOGEN UR QL STRIP: ABNORMAL
WBC # BLD AUTO: 5.05 10*3/MM3 (ref 3.4–10.8)
WHOLE BLOOD HOLD SPECIMEN: NORMAL
WHOLE BLOOD HOLD SPECIMEN: NORMAL

## 2021-04-04 PROCEDURE — C9803 HOPD COVID-19 SPEC COLLECT: HCPCS

## 2021-04-04 PROCEDURE — 96375 TX/PRO/DX INJ NEW DRUG ADDON: CPT

## 2021-04-04 PROCEDURE — 70498 CT ANGIOGRAPHY NECK: CPT

## 2021-04-04 PROCEDURE — 99204 OFFICE O/P NEW MOD 45 MIN: CPT | Performed by: NURSE PRACTITIONER

## 2021-04-04 PROCEDURE — 0 IOPAMIDOL PER 1 ML: Performed by: INTERNAL MEDICINE

## 2021-04-04 PROCEDURE — 81003 URINALYSIS AUTO W/O SCOPE: CPT | Performed by: INTERNAL MEDICINE

## 2021-04-04 PROCEDURE — 83690 ASSAY OF LIPASE: CPT | Performed by: EMERGENCY MEDICINE

## 2021-04-04 PROCEDURE — 70496 CT ANGIOGRAPHY HEAD: CPT

## 2021-04-04 PROCEDURE — G0378 HOSPITAL OBSERVATION PER HR: HCPCS

## 2021-04-04 PROCEDURE — 25010000003 MAGNESIUM SULFATE 4 GM/100ML SOLUTION: Performed by: INTERNAL MEDICINE

## 2021-04-04 PROCEDURE — 70450 CT HEAD/BRAIN W/O DYE: CPT

## 2021-04-04 PROCEDURE — 70551 MRI BRAIN STEM W/O DYE: CPT

## 2021-04-04 PROCEDURE — 82962 GLUCOSE BLOOD TEST: CPT

## 2021-04-04 PROCEDURE — 0042T HC CT CEREBRAL PERFUSION W/WO CONTRAST: CPT

## 2021-04-04 PROCEDURE — 87636 SARSCOV2 & INF A&B AMP PRB: CPT | Performed by: EMERGENCY MEDICINE

## 2021-04-04 PROCEDURE — 25010000002 DIMENHYDRINATE 50 MG/ML SOLUTION: Performed by: EMERGENCY MEDICINE

## 2021-04-04 PROCEDURE — 71045 X-RAY EXAM CHEST 1 VIEW: CPT

## 2021-04-04 PROCEDURE — 99285 EMERGENCY DEPT VISIT HI MDM: CPT

## 2021-04-04 PROCEDURE — 83735 ASSAY OF MAGNESIUM: CPT | Performed by: EMERGENCY MEDICINE

## 2021-04-04 PROCEDURE — 99220 PR INITIAL OBSERVATION CARE/DAY 70 MINUTES: CPT | Performed by: INTERNAL MEDICINE

## 2021-04-04 PROCEDURE — 85025 COMPLETE CBC W/AUTO DIFF WBC: CPT | Performed by: EMERGENCY MEDICINE

## 2021-04-04 PROCEDURE — 25010000002 LORAZEPAM PER 2 MG: Performed by: EMERGENCY MEDICINE

## 2021-04-04 PROCEDURE — 80053 COMPREHEN METABOLIC PANEL: CPT | Performed by: EMERGENCY MEDICINE

## 2021-04-04 PROCEDURE — 83880 ASSAY OF NATRIURETIC PEPTIDE: CPT | Performed by: EMERGENCY MEDICINE

## 2021-04-04 PROCEDURE — 93005 ELECTROCARDIOGRAM TRACING: CPT

## 2021-04-04 PROCEDURE — 96374 THER/PROPH/DIAG INJ IV PUSH: CPT

## 2021-04-04 PROCEDURE — 93005 ELECTROCARDIOGRAM TRACING: CPT | Performed by: EMERGENCY MEDICINE

## 2021-04-04 PROCEDURE — 96376 TX/PRO/DX INJ SAME DRUG ADON: CPT

## 2021-04-04 PROCEDURE — 82077 ASSAY SPEC XCP UR&BREATH IA: CPT | Performed by: PHYSICIAN ASSISTANT

## 2021-04-04 PROCEDURE — 84484 ASSAY OF TROPONIN QUANT: CPT | Performed by: EMERGENCY MEDICINE

## 2021-04-04 PROCEDURE — 25010000002 ONDANSETRON PER 1 MG: Performed by: EMERGENCY MEDICINE

## 2021-04-04 RX ORDER — MAGNESIUM SULFATE HEPTAHYDRATE 40 MG/ML
4 INJECTION, SOLUTION INTRAVENOUS AS NEEDED
Status: DISCONTINUED | OUTPATIENT
Start: 2021-04-04 | End: 2021-04-05 | Stop reason: HOSPADM

## 2021-04-04 RX ORDER — SODIUM CHLORIDE 0.9 % (FLUSH) 0.9 %
10 SYRINGE (ML) INJECTION AS NEEDED
Status: DISCONTINUED | OUTPATIENT
Start: 2021-04-04 | End: 2021-04-05 | Stop reason: HOSPADM

## 2021-04-04 RX ORDER — DIMENHYDRINATE 50 MG/ML
25 INJECTION, SOLUTION INTRAMUSCULAR; INTRAVENOUS ONCE
Status: COMPLETED | OUTPATIENT
Start: 2021-04-04 | End: 2021-04-04

## 2021-04-04 RX ORDER — POTASSIUM CHLORIDE 750 MG/1
40 CAPSULE, EXTENDED RELEASE ORAL AS NEEDED
Status: DISCONTINUED | OUTPATIENT
Start: 2021-04-04 | End: 2021-04-05 | Stop reason: HOSPADM

## 2021-04-04 RX ORDER — DIMENHYDRINATE 50 MG/ML
50 INJECTION, SOLUTION INTRAMUSCULAR; INTRAVENOUS ONCE
Status: COMPLETED | OUTPATIENT
Start: 2021-04-04 | End: 2021-04-04

## 2021-04-04 RX ORDER — POTASSIUM CHLORIDE 7.45 MG/ML
10 INJECTION INTRAVENOUS
Status: DISCONTINUED | OUTPATIENT
Start: 2021-04-04 | End: 2021-04-05 | Stop reason: HOSPADM

## 2021-04-04 RX ORDER — ONDANSETRON 2 MG/ML
4 INJECTION INTRAMUSCULAR; INTRAVENOUS ONCE
Status: COMPLETED | OUTPATIENT
Start: 2021-04-04 | End: 2021-04-04

## 2021-04-04 RX ORDER — SODIUM CHLORIDE 0.9 % (FLUSH) 0.9 %
10 SYRINGE (ML) INJECTION EVERY 12 HOURS SCHEDULED
Status: DISCONTINUED | OUTPATIENT
Start: 2021-04-04 | End: 2021-04-05 | Stop reason: HOSPADM

## 2021-04-04 RX ORDER — LORAZEPAM 2 MG/ML
1 INJECTION INTRAMUSCULAR ONCE
Status: COMPLETED | OUTPATIENT
Start: 2021-04-04 | End: 2021-04-04

## 2021-04-04 RX ORDER — PANTOPRAZOLE SODIUM 40 MG/1
40 TABLET, DELAYED RELEASE ORAL EVERY MORNING
Status: DISCONTINUED | OUTPATIENT
Start: 2021-04-05 | End: 2021-04-05 | Stop reason: HOSPADM

## 2021-04-04 RX ORDER — ONDANSETRON 2 MG/ML
4 INJECTION INTRAMUSCULAR; INTRAVENOUS EVERY 6 HOURS PRN
Status: DISCONTINUED | OUTPATIENT
Start: 2021-04-04 | End: 2021-04-05 | Stop reason: HOSPADM

## 2021-04-04 RX ORDER — POTASSIUM CHLORIDE 1.5 G/1.77G
40 POWDER, FOR SOLUTION ORAL AS NEEDED
Status: DISCONTINUED | OUTPATIENT
Start: 2021-04-04 | End: 2021-04-05 | Stop reason: HOSPADM

## 2021-04-04 RX ORDER — MAGNESIUM SULFATE HEPTAHYDRATE 40 MG/ML
2 INJECTION, SOLUTION INTRAVENOUS AS NEEDED
Status: DISCONTINUED | OUTPATIENT
Start: 2021-04-04 | End: 2021-04-05 | Stop reason: HOSPADM

## 2021-04-04 RX ORDER — ATORVASTATIN CALCIUM 40 MG/1
80 TABLET, FILM COATED ORAL NIGHTLY
Status: DISCONTINUED | OUTPATIENT
Start: 2021-04-04 | End: 2021-04-05 | Stop reason: HOSPADM

## 2021-04-04 RX ADMIN — MAGNESIUM SULFATE HEPTAHYDRATE 4 G: 40 INJECTION, SOLUTION INTRAVENOUS at 17:56

## 2021-04-04 RX ADMIN — POTASSIUM CHLORIDE 40 MEQ: 750 CAPSULE, EXTENDED RELEASE ORAL at 17:56

## 2021-04-04 RX ADMIN — ONDANSETRON 4 MG: 2 INJECTION INTRAMUSCULAR; INTRAVENOUS at 13:17

## 2021-04-04 RX ADMIN — SODIUM CHLORIDE 1000 ML: 9 INJECTION, SOLUTION INTRAVENOUS at 13:16

## 2021-04-04 RX ADMIN — SODIUM CHLORIDE, PRESERVATIVE FREE 10 ML: 5 INJECTION INTRAVENOUS at 23:01

## 2021-04-04 RX ADMIN — DIMENHYDRINATE 50 MG: 50 INJECTION, SOLUTION INTRAMUSCULAR; INTRAVENOUS at 13:17

## 2021-04-04 RX ADMIN — DIMENHYDRINATE 25 MG: 50 INJECTION, SOLUTION INTRAMUSCULAR; INTRAVENOUS at 14:29

## 2021-04-04 RX ADMIN — IOPAMIDOL 120 ML: 755 INJECTION, SOLUTION INTRAVENOUS at 16:19

## 2021-04-04 RX ADMIN — ATORVASTATIN CALCIUM 80 MG: 40 TABLET, FILM COATED ORAL at 23:00

## 2021-04-04 RX ADMIN — LORAZEPAM 1 MG: 2 INJECTION INTRAMUSCULAR; INTRAVENOUS at 14:29

## 2021-04-04 NOTE — CONSULTS
Stroke Consult Note    Patient Name: Tucker Johnson   MRN: [id number]  Age: 71 y.o.  Sex: male  : 1950    Primary Care Physician: Jeet Davis MD  Referring Physician:  Dr Rainey    TIME STROKE TEAM CALLED: 1530 EST     TIME PATIENT SEEN: 1530 EST    Handedness:left    Race: white    Chief Complaint/Reason for Consultation: Dizziness    HPI: Mr. Johnson is a 71-year-old white, left-handed male with known diagnosis of allergies, GERD, JERE corrected by surgery, former tobacco abuse, ETOH use (3 liquor drinks/day) that presents with dizziness.  Patient reports he was mowing his lawn when he had sudden onset of dizziness, described as a spinning sensation. He had to crawl into the house and his wife called EMS. Had some associated nausea, vomiting, shortness of breath and diaphoresis. Both arms felt numb/tingling at the time, left greater than right.  Has had similar episode in the past and was diagnosed with vertigo.    He is not on antithrombotics.    Last Known Normal Date/Time:1100 EST     Review of Systems   Constitutional: Negative for fever.   HENT: Negative for trouble swallowing.    Eyes: Negative for visual disturbance.   Respiratory: Positive for shortness of breath.    Cardiovascular: Negative for chest pain and palpitations.   Gastrointestinal: Positive for nausea and vomiting.   Endocrine: Negative for cold intolerance and heat intolerance.   Genitourinary: Negative for difficulty urinating.   Musculoskeletal: Positive for gait problem.   Skin: Negative.    Allergic/Immunologic: Negative for immunocompromised state.   Neurological: Positive for dizziness, weakness and numbness. Negative for tremors, seizures, syncope, facial asymmetry, speech difficulty, light-headedness and headaches.   Hematological: Negative.    Psychiatric/Behavioral: Negative.         Temp:  [97.9 °F (36.6 °C)] 97.9 °F (36.6 °C)  Heart Rate:  [] 84  Resp:  [24] 24  BP: (145-164)/()  146/97    Neurological Exam  Mental Status  Awake, alert and oriented to person, place and time.Alert. Recent and remote memory are intact. Speech is normal. Language is fluent with no aphasia. Attention and concentration are normal. Fund of knowledge is appropriate for level of education.    Cranial Nerves  CN II: Near vision tested.  CN III, IV, VI: Nystagmus present: Spontaneous nystagmus, worse with right gaze with some torsional/rotatory component.  CN V: Facial sensation is normal.  CN VII: Full and symmetric facial movement.  CN IX, X: Palate elevates symmetrically  CN XI: Shoulder shrug strength is normal.  CN XII: Tongue midline without atrophy or fasciculations.    Motor  Normal muscle bulk throughout. No fasciculations present. Normal muscle tone. Strength is 5/5 throughout all four extremities.    Sensory  Light touch is normal in upper and lower extremities.     Coordination  Right: Finger-to-nose normal.  Left: Finger-to-nose abnormality:    Gait  Not observed.      Physical Exam  Vitals reviewed.   Constitutional:       Appearance: Normal appearance.   HENT:      Head: Normocephalic and atraumatic.      Mouth/Throat:      Mouth: Mucous membranes are moist.   Eyes:      Extraocular Movements: Nystagmus present.   Cardiovascular:      Rate and Rhythm: Normal rate.   Pulmonary:      Effort: Pulmonary effort is normal.   Skin:     General: Skin is warm and dry.   Neurological:      Mental Status: He is alert.      Deep Tendon Reflexes: Strength normal.   Psychiatric:         Mood and Affect: Mood normal.         Speech: Speech normal.         Behavior: Behavior normal.         Thought Content: Thought content normal.         Judgment: Judgment normal.         Acute Stroke Data    Alteplase (tPA) Inclusion / Exclusion Criteria    Time: 1530  Person Administering Scale: STUART Magallon    Inclusion Criteria  [x]   18 years of age or greater   []   Onset of symptoms < 4.5 hours before beginning  treatment (stroke onset = time patient was last seen well or without symptoms).   []   Diagnosis of acute ischemic stroke causing measurable disabling deficit (Complete Hemianopia, Any Aphasia, Visual or Sensory Extinction, Any weakness limiting sustained effort against gravity)   []   Any remaining deficit considered potentially disabling in view of patient and practitioner   Exclusion criteria (Do not proceed with Alteplase if any are checked under exclusion criteria)  [x]   Onset unknown or GREATER than 4.5 hours   []   ICH on CT/MRI   []   CT demonstrates hypodensity representing acute or subacute infarct   []   Significant head trauma or prior stroke in the previous 3 months   []   Symptoms suggestive of subarachnoid hemorrhage   []   History of un-ruptured intracranial aneurysm GREATER than 10 mm   []   Recent intracranial or intraspinal surgery within the last 3 months   []   Arterial puncture at a non-compressible site in the previous 7 days   []   Active internal bleeding   []   Acute bleeding tendency   []   Platelet count LESS than 100,000 for known hematological diseases such as leukemia, thrombocytopenia or chronic cirrhosis   []   Current use of anticoagulant with INR GREATER than 1.7 or PT GREATER than 15 seconds, aPTT GREATER than 40 seconds   []   Heparin received within 48 hours, resulting in abnormally elevated aPTT GREATER than upper limit of normal   []   Current use of direct thrombin inhibitors or direct factor Xa inhibitors in the past 48 hours   []   Elevated blood pressure refractory to treatment (systolic GREATER than 185 mm/Hg or diastolic  GREATER than 110 mm/Hg   []   Suspected infective endocarditis and aortic arch dissection   []   Current use of therapeutic treatment dose of low-molecular-weight heparin (LMWH) within the previous 24 hours   []   Structural GI malignancy or bleed   Relative exclusion for all patients  []   Only minor non-disabling symptoms   []   Pregnancy   []    Seizure at onset with postictal residual neurological impairments   []   Major surgery or previous trauma within past 14 days   []   History of previous spontaneous ICH, intracranial neoplasm, or AV malformation   []   Postpartum (within previous 14 days)   []   Recent GI or urinary tract hemorrhage (within previous 21 days)   []   Recent acute MI (within previous 3 months)   []   History of un-ruptured intracranial aneurysm LESS than 10 mm   []   History of ruptured intracranial aneurysm   []   Blood glucose LESS than 50 mg/dL (2.7 mmol/L)   []   Dural puncture within the last 7 days   []   Known GREATER than 10 cerebral microbleeds   Additional exclusions for patients with symptoms onset between 3 and 4.5 hours.  []   Age > 80.   []   On any anticoagulants regardless of INR  >>> Warfarin (Coumadin), Heparin, Enoxaparin (Lovenox), fondaparinux (Arixtra), bivalirudin (Angiomax), Argatroban, dabigatran (Pradaxa), rivaroxaban (Xarelto), or apixaban (Eliquis)   []   Severe stroke (NIHSS > 25).   []   History of BOTH diabetes and previous ischemic stroke.   []   The risks and benefits have been discussed with the patient or family related to the administration of IV Alteplase for stroke symptoms.   []   I have discussed and reviewed the patient's case and imaging with the attending prior to IV Alteplase.   Not administered Time Alteplase administered       Past Medical History:   Diagnosis Date   • Allergic    • Arthritis    • GERD (gastroesophageal reflux disease)    • Hyperlipidemia    • JERE (obstructive sleep apnea)     treated with surgery   • Tobacco abuse      Past Surgical History:   Procedure Laterality Date   • ELBOW ARTHROPLASTY     • SHOULDER SURGERY     • UVULOPALATOPHARYNGOPLASTY       Family History   Problem Relation Age of Onset   • Stroke Mother    • Heart disease Father      Social History     Socioeconomic History   • Marital status:      Spouse name: Not on file   • Number of children: Not on  file   • Years of education: Not on file   • Highest education level: Not on file   Tobacco Use   • Smoking status: Former Smoker     Years: 30.00     Types: Cigars, Cigarettes   • Smokeless tobacco: Never Used   Substance and Sexual Activity   • Alcohol use: Yes     Alcohol/week: 3.0 standard drinks     Types: 3 Shots of liquor per week   • Drug use: No   • Sexual activity: Defer     No Known Allergies  Prior to Admission medications    Medication Sig Start Date End Date Taking? Authorizing Provider   atorvastatin (LIPITOR) 20 MG tablet Take 1 tablet by mouth Daily. 200001 12/9/20  Yes Jeet Davis MD   Diclofenac Sodium (VOLTAREN) 1 % gel gel Apply 4 g topically to the appropriate area as directed 4 (Four) Times a Day As Needed.   Yes Víctor Ennis MD   lansoprazole (PREVACID) 30 MG capsule Take 1 capsule by mouth Daily. 12/9/20  Yes Jeet Davis MD   Misc Natural Products (OSTEO BI-FLEX ADV DOUBLE ST PO) Take  by mouth.   Yes Víctor Ennis MD   Multiple Vitamins-Minerals (CENTRUM SILVER ADULT 50+ PO) Take  by mouth.   Yes Víctor Ennis MD   ZyrTEC-D Allergy & Congestion 5-120 MG per 12 hr tablet TAKE 1 TABLET BY MOUTH TWO TIMES A DAY  12/4/20  Yes Jeet Davis MD   FLUAD 0.5 ML suspension prefilled syringe INJECT ONE INFLUENZA VACCINE PER PROTOCOL 10/6/19   Víctor Ennis MD   HAVRIX 1440 EL U/ML vaccine ADMINISTER VACCINE PER PHYSICIAN PROTOCOL 10/6/19   Víctor Ennis MD   SHINGRIX 50 MCG/0.5ML reconstituted suspension ADMINISTER VACCINE PER PHYSICIAN PROTOCOL 10/6/19   Víctor Ennis MD       Hospital Meds:  Scheduled-    Infusions-     PRNs- •  magnesium sulfate **OR** magnesium sulfate **OR** magnesium sulfate  •  potassium & sodium phosphates **OR** potassium & sodium phosphates  •  potassium chloride **OR** potassium chloride **OR** potassium chloride  •  sodium chloride    Functional Status Prior to Current  Stroke/[place] Score: 0    NIH Stroke Scale  Time: 1530  Person Administering Scale: STUART Magallon    1a  Level of consciousness: 0=alert; keenly responsive   1b. LOC questions:  0=Performs both tasks correctly   1c. LOC commands: 0=Performs both tasks correctly   2.  Best Gaze: 0=normal   3.  Visual: 0=No visual loss   4. Facial Palsy: 0=Normal symmetric movement   5a.  Motor left arm: 0=No drift, limb holds 90 (or 45) degrees for full 10 seconds   5b.  Motor right arm: 0=No drift, limb holds 90 (or 45) degrees for full 10 seconds   6a. motor left le=No drift, limb holds 90 (or 45) degrees for full 10 seconds   6b  Motor right le=No drift, limb holds 90 (or 45) degrees for full 10 seconds   7. Limb Ataxia: 1=Present in one limb   8.  Sensory: 0=Normal; no sensory loss   9. Best Language:  0=No aphasia, normal   10. Dysarthria: 0=Normal   11. Extinction and Inattention: 0=No abnormality    Total:   1       Results Reviewed:  I have personally reviewed current lab, radiology, and data and agree with results.  Lab Results (last 24 hours)     Procedure Component Value Units Date/Time    Ethanol [[id number]]  (Normal) Collected: 21 1416    Specimen: Blood Updated: 21     Ethanol <10 mg/dL     COVID-19 and FLU A/B PCR - Swab, Nasopharynx [[id number]]  (Normal) Collected: 21 1551    Specimen: Swab from Nasopharynx Updated: 21 1644     COVID19 Not Detected     Influenza A PCR Not Detected     Influenza B PCR Not Detected    Narrative:      Fact sheet for providers: https://www.fda.gov/media/181723/download    Fact sheet for patients: https://www.fda.gov/media/336676/download    Test performed by PCR.    Urinalysis With Culture If Indicated - Urine, Clean Catch [[id number]]  (Abnormal) Collected: 21 1627    Specimen: Urine, Clean Catch Updated: 21 1643     Color, UA Yellow     Appearance, UA Clear     pH, UA >=9.0     Specific Gravity, UA 1.014     Glucose, UA Negative      Ketones, UA 15 mg/dL (1+)     Bilirubin, UA Negative     Blood, UA Negative     Protein, UA Negative     Leuk Esterase, UA Negative     Nitrite, UA Negative     Urobilinogen, UA 0.2 E.U./dL    Narrative:      Urine microscopic not indicated.    Troponin [[id number]]  (Normal) Collected: 04/04/21 1416    Specimen: Blood Updated: 04/04/21 1444     Troponin T <0.010 ng/mL     Narrative:      Troponin T Reference Range:  <= 0.03 ng/mL-   Negative for AMI  >0.03 ng/mL-     Abnormal for myocardial necrosis.  Clinicians would have to utilize clinical acumen, EKG, Troponin and serial changes to determine if it is an Acute Myocardial Infarction or myocardial injury due to an underlying chronic condition.       Results may be falsely decreased if patient taking Biotin.      [place] Draw [[id number]] Collected: 04/04/21 1227    Specimen: Blood Updated: 04/04/21 1330    Narrative:      The following orders were created for panel order [place] Draw.  Procedure                               Abnormality         Status                     ---------                               -----------         ------                     Light Blue Top[[id number]]                                   Final result               Green Top (Gel)[[id number]]                                  Final result               Lavender Top[[id number]]                                     Final result               Gold Top - SST[[id number]]                                   Final result               Gray Top - Ice[[id number]]                                   Final result                 Please view results for these tests on the individual orders.    Lavender Top [[id number]] Collected: 04/04/21 1227    Specimen: Blood Updated: 04/04/21 1330     Extra Tube hold for add-on     Comment: Auto resulted       Gold Top - SST [[id number]] Collected: 04/04/21 1227    Specimen: Blood Updated: 04/04/21 1330     Extra Tube Hold for add-ons.     Comment: Auto resulted.        Light Blue Top [[id number]] Collected: 04/04/21 1227    Specimen: Blood Updated: 04/04/21 1330     Extra Tube hold for add-on     Comment: Auto resulted       Green Top (Gel) [[id number]] Collected: 04/04/21 1227    Specimen: Blood Updated: 04/04/21 1330     Extra Tube Hold for add-ons.     Comment: Auto resulted.       Gray Top - Ice [[id number]] Collected: 04/04/21 1227    Specimen: Blood Updated: 04/04/21 1330     Extra Tube Hold for add-ons.     Comment: Auto resulted.       Magnesium [[id number]]  (Normal) Collected: 04/04/21 1227    Specimen: Blood Updated: 04/04/21 1301     Magnesium 1.6 mg/dL     Comprehensive Metabolic Panel [[id number]]  (Abnormal) Collected: 04/04/21 1227    Specimen: Blood Updated: 04/04/21 1256     Glucose 166 mg/dL      BUN 15 mg/dL      Creatinine 1.01 mg/dL      Sodium 138 mmol/L      Potassium 3.4 mmol/L      Chloride 102 mmol/L      CO2 20.0 mmol/L      Calcium 9.3 mg/dL      Total Protein 6.4 g/dL      Albumin 4.50 g/dL      ALT (SGPT) 32 U/L      AST (SGOT) 28 U/L      Alkaline Phosphatase 48 U/L      Total Bilirubin 2.3 mg/dL      eGFR Non African Amer 72 mL/min/1.73      Globulin 1.9 gm/dL      A/G Ratio 2.4 g/dL      BUN/Creatinine Ratio 14.9     Anion Gap 16.0 mmol/L     Narrative:      GFR Normal >60  Chronic Kidney Disease <60  Kidney Failure <15      Lipase [[id number]]  (Normal) Collected: 04/04/21 1227    Specimen: Blood Updated: 04/04/21 1256     Lipase 20 U/L     Troponin [[id number]]  (Normal) Collected: 04/04/21 1227    Specimen: Blood Updated: 04/04/21 1256     Troponin T <0.010 ng/mL     Narrative:      Troponin T Reference Range:  <= 0.03 ng/mL-   Negative for AMI  >0.03 ng/mL-     Abnormal for myocardial necrosis.  Clinicians would have to utilize clinical acumen, EKG, Troponin and serial changes to determine if it is an Acute Myocardial Infarction or myocardial injury due to an underlying chronic condition.       Results may be falsely decreased if patient taking  Biotin.      BNP [[id number]]  (Normal) Collected: 04/04/21 1227    Specimen: Blood Updated: 04/04/21 1255     proBNP 164.9 pg/mL     Narrative:      Among patients with dyspnea, NT-proBNP is highly sensitive for the detection of acute congestive heart failure. In addition NT-proBNP of <300 pg/ml effectively rules out acute congestive heart failure with 99% negative predictive value.    Results may be falsely decreased if patient taking Biotin.      CBC & Differential [[id number]]  (Abnormal) Collected: 04/04/21 1227    Specimen: Blood Updated: 04/04/21 1238    Narrative:      The following orders were created for panel order CBC & Differential.  Procedure                               Abnormality         Status                     ---------                               -----------         ------                     Scan Slide[[id number]]                                                                  CBC Auto Differential[[id number]]        Abnormal            Final result                 Please view results for these tests on the individual orders.    CBC Auto Differential [[id number]]  (Abnormal) Collected: 04/04/21 1227    Specimen: Blood Updated: 04/04/21 1238     WBC 5.05 10*3/mm3      RBC 4.72 10*6/mm3      Hemoglobin 15.4 g/dL      Hematocrit 41.5 %      MCV 87.9 fL      MCH 32.6 pg      MCHC 37.1 g/dL      RDW 11.7 %      RDW-SD 37.9 fl      MPV 10.0 fL      Platelets 78 10*3/mm3      Neutrophil % 50.9 %      Lymphocyte % 35.2 %      Monocyte % 11.1 %      Eosinophil % 1.6 %      Basophil % 0.2 %      Immature Grans % 1.0 %      Neutrophils, Absolute 2.57 10*3/mm3      Lymphocytes, Absolute 1.78 10*3/mm3      Monocytes, Absolute 0.56 10*3/mm3      Eosinophils, Absolute 0.08 10*3/mm3      Basophils, Absolute 0.01 10*3/mm3      Immature Grans, Absolute 0.05 10*3/mm3      nRBC 0.0 /100 WBC         Imaging Results (Last 24 Hours)     Procedure Component Value Units Date/Time    MRI Brain Without Contrast  [[id number]] Resulted: 04/04/21 1705     Updated: 04/04/21 1705    CT Head Without Contrast [[id number]] Collected: 04/04/21 1310     Updated: 04/04/21 1643    Narrative:      EXAMINATION: CT HEAD WO CONTRAST - 04/04/2021      INDICATION: Vertigo     TECHNIQUE: 5 mm enhanced images to the brain.     The radiation dose reduction device was turned on for each scan per the  ALARA (As Low as Reasonably Achievable) protocol.     COMPARISON: None.     FINDINGS: History indicates vertigo, onset while mowing. The calvarium  appears intact. Included paranasal sinuses, mastoids and middle ear  spaces appear clear. Soft tissue window images show age appropriate  generalized cerebral atrophy. There is no evidence of infarct/edema, no  evidence of hemorrhage, hydrocephalus, mass or mass effect, or abnormal  extra-axial collection. No unusual focal encephalomalacia or significant  white matter disease is appreciated.       Impression:      Age-appropriate generalized cerebral atrophy. No evidence of  acute intracranial disease is seen.     DICTATED:   04/04/2021  EDITED/ls :   04/04/2021          CT Angiogram Head [[id number]] Collected: 04/04/21 1635     Updated: 04/04/21 1643    Narrative:      EXAMINATION: CT ANGIOGRAM HEAD-      INDICATION: Dizziness            TECHNIQUE: pre and postcontrast 3 mm and 0.75 mm axial images through  the brain with 2-D and 3-D angiographic reconstructions.           The radiation dose reduction device was turned on for each scan per the  ALARA (As Low as Reasonably Achievable) protocol.     COMPARISON: NONE     FINDINGS: Distal vertebral arteries, basilar artery, and posterior  cerebral arteries appear within normal limits. Intracranial portions of  the internal carotid arteries appear normal in caliber with no  significant calcification. Anterior and middle cerebral arteries and  proximal branches appear within normal limits. No evidence of  hemodynamically significant intracranial vascular  stenosis.             Impression:      Negative CTA of the head. No evidence of hemodynamically  significant intracranial vascular stenosis.          CT Angiogram Neck [[id number]] Collected: 04/04/21 1628     Updated: 04/04/21 1643    Narrative:      EXAMINATION: CT ANGIOGRAM NECK-      INDICATION: Stroke, follow up     TECHNIQUE: Spiral acquisition pre and post contrast 3 mm and 0.75 mm  axial images through the neck with sagittal and coronal 2-D  reconstructions and 3-D VRT and MIP angiographic reconstructions.           The radiation dose reduction device was turned on for each scan per the  ALARA (As Low as Reasonably Achievable) protocol.     COMPARISON: NONE     FINDINGS: Proximal subclavian arteries are visible appear normal in  caliber.     On the right, no hemodynamically significant common carotid stenosis is  seen. There is fairly dense carotid bulb plaquing but no evidence of  hemodynamically significant stenosis. No ICA or ECA stenosis is seen to  the level of skull base.     On the left, no evidence of hemodynamically significant common internal,  or external carotid artery stenosis is seen. There is relatively milder  calcified plaquing of the common carotid bulb.     Vertebral arteries appear codominant. Attenuated appearance of the mid  right vertebral artery at the level of C4 appears to be due to dense  streak artifact from the patient's dental appliances. Left vertebral  artery is partly obscured by streak artifact at this same level.  Vertebral arteries elsewhere appear normal.     No significant incidental soft tissue neck pathology is appreciated.  Included lung apices appear grossly clear.             Impression:      Mildly limited evaluation of the mid vertebral arteries due  to streak artifact from patient's dental appliances. Allowing for this,  no evidence of hemodynamically significant carotid or vertebral artery  stenosis in the neck. Bilateral carotid bulb calcification  incidentally  noted.          CT Cerebral Perfusion With & Without Contrast [[id number]] Collected: 04/04/21 1626     Updated: 04/04/21 1643    Narrative:      EXAMINATION: CT CEREBRAL PERFUSION W WO CONTRAST-      INDICATION: Neuro deficit, acute, stroke suspected     TECHNIQUE: Cerebral perfusion analysis was performed using computed  tomography with IV contrast administration including postprocessing of  parametric maps with determination of cerebral blood flow, cerebral  blood volume, mean transit time and time to drain and T2-Max.     The radiation dose reduction device was turned on for each scan per the  ALARA (As Low as Reasonably Achievable) protocol.     COMPARISON: Unenhanced head CT scan of same date     FINDINGS: Rapid analysis is negative with no areas of the brain showing  T-Max greater than six seconds or cerebral blood flow less than 30%.  Individual perfusion maps do not appear to show a consistent focal  perfusion abnormality to suggest focal ischemia or infarct.             Impression:      Negative perfusion scan.          XR Chest 1 View [[id number]] Collected: 04/04/21 1302     Updated: 04/04/21 1303    Narrative:      EXAMINATION: XR CHEST 1 VW-     INDICATION: Chest Pain triage protocol     COMPARISON: NONE     FINDINGS: Heart mediastinum and pulmonary vasculature appear within  normal limits. Lungs appear well-expanded and grossly clear. Slight  coarsening of basilar interstitial markings is probably chronic. No lung  consolidation effusion or pneumothorax is seen.          Impression:      Mild chronic appearing changes of the lower lungs. No  clearly acute chest disease is identified.                   Assessment/Plan:  Mr. Johnson is a 71-year-old white, left-handed male with known diagnosis of allergies, GERD, JERE corrected by surgery, former tobacco abuse, ETOH use (3 liquor drinks/day) that presents with dizziness. Given that nystagmus on exam is nonfatiguable, spontaneous, worse with  right gaze with some torsional/rotatory component we would like to proceed with imaging.      Imaging/Labs reviewed  -CT head negative for acute change    1.  Dizziness/Nystagmus  -CTA head/neck, CTP now  -MRI brain without contrast  -initiate TIA/AIS order set  -ASA 81mg daily  -initiate high intensity statin  -check echo  -lipid panel and a1c in am    Thank you for the consult. Pt seen and examined with Dr Cole at bedside. Discussed with Dr Rainey, patient, wife and nursing. Stroke neurology will follow.    Melissa Kimbrough, APRN  April 4, 2021  17:10 EDT

## 2021-04-04 NOTE — PLAN OF CARE
Patient received from ER. Denies pain or discomfort at this time. Complains of dizziness while sitting. Room air. Telemetry running NSR. Fall precautions in use. Call light in reach to make needs known.    Problem: Adult Inpatient Plan of Care  Goal: Plan of Care Review  Outcome: Ongoing, Progressing   Goal Outcome Evaluation:

## 2021-04-04 NOTE — H&P
[facility] Medicine Services  HISTORY AND PHYSICAL    Patient Name: Tucker Johnson  : 1950  MRN: [id number]  Primary Care Physician: Jeet Davis MD  Date of admission: 2021    Subjective   Subjective     Chief Complaint:  dizziness    HPI:  Tucker Johnson is a 71 y.o. male with medical history significant for, allergies, GERD, JERE corrected by surgery, tobacco abuse who presents with dizziness.  The patient reports around 11:00 today,  he began having dizziness.  He reports he was mowing the lawn and had a sudden onset of spinning sensation.  He walked to the garage and sat down.  He began to feel worse and ultimately laid down on the ground.  He had water nearby and started taking some sips of water.  He was able to crawl into the house and have his wife call 911.  He states he felt extremely nauseated and short of breath during that time.  He also reported bilateral hand numbness, left greater than right.  At this time currently in the ER, he feels much better and denies shortness of breath, dyspnea, chest pain, melena, hematochezia, dysuria.  He denies any recent chest pain, dizziness or other history of symptoms like this. He was given ativan and benadryl so his exam is somewhat limited due to fatigue/med effect      He is former smoker.  He drinks 3 liquor drinks daily. Upon arrival to ER, his vital signs were stable however his O2 level dropped to 86% and he had to be placed on oxygen.  His head CT shows age appropriate generalized cerebral atrophy without evidence of acute intracranial disease.  Xray shows mild chronic appearing changes of lower lungs.      COVID Details: [x] No Symptoms  Symptoms: [] Fever []  Cough [] Shortness of breath [] Change in taste or smell  Risks:   [] Direct Exposure [] High risk facility       Review of Systems   Constitutional: Negative for chills, fatigue and fever.   Respiratory: Positive for shortness of breath.  Negative for cough, choking and chest tightness.    Cardiovascular: Negative for chest pain, palpitations and leg swelling.   Gastrointestinal: Negative for abdominal pain, constipation, diarrhea, nausea and vomiting.   Musculoskeletal: Negative for back pain and myalgias.   Neurological: Positive for dizziness, weakness and numbness (bilateral hands, Left > right). Negative for syncope, speech difficulty and light-headedness.   All other systems reviewed and are negative.         Personal History     Past Medical History:   Diagnosis Date   • Allergic    • Arthritis    • GERD (gastroesophageal reflux disease)    • Hyperlipidemia    • JERE (obstructive sleep apnea)     treated with surgery   • Tobacco abuse        Past Surgical History:   Procedure Laterality Date   • ELBOW ARTHROPLASTY     • SHOULDER SURGERY     • UVULOPALATOPHARYNGOPLASTY         Family History: family history includes Heart disease in his father; Stroke in his mother. Otherwise pertinent FHx was reviewed and unremarkable.     Social History:  reports that he has quit smoking. His smoking use included cigars and cigarettes. He quit after 30.00 years of use. He has never used smokeless tobacco. He reports current alcohol use of about 3.0 standard drinks of alcohol per week. He reports that he does not use drugs.  Social History     Social History Narrative    Lives with wife in [place].       Medications:  Diclofenac Sodium, Influenza Vac A&B Surf Ant Adj, Misc Natural Products, Zoster Vac Recomb Adjuvanted, atorvastatin, cetirizine-pseudoephedrine, hepatitis A, lansoprazole, and multivitamin with minerals    No Known Allergies    Objective   Objective     Vital Signs:   Temp:  [97.9 °F (36.6 °C)] 97.9 °F (36.6 °C)  Heart Rate:  [] 84  Resp:  [24] 24  BP: (145-164)/() 146/97  Flow (L/min):  [2] 2    Physical Exam  Vitals and nursing note reviewed.   Constitutional:       Appearance: Normal appearance.   HENT:      Head: Normocephalic  and atraumatic.      Right Ear: External ear normal.      Left Ear: External ear normal.      Nose: No congestion.      Mouth/Throat:      Mouth: Mucous membranes are moist.      Pharynx: No oropharyngeal exudate.   Cardiovascular:      Rate and Rhythm: Normal rate and regular rhythm.      Pulses: Normal pulses.      Heart sounds: No murmur heard.     Pulmonary:      Effort: Pulmonary effort is normal. No respiratory distress.      Breath sounds: No wheezing.   Abdominal:      General: Abdomen is flat.      Tenderness: There is no abdominal tenderness.      Hernia: No hernia is present.   Musculoskeletal:         General: Normal range of motion.      Cervical back: Normal range of motion.   Skin:     General: Skin is warm.      Coloration: Skin is not jaundiced.   Neurological:      General: No focal deficit present.      Mental Status: He is alert and oriented to person, place, and time.      Cranial Nerves: No cranial nerve deficit.      Comments: Cranial nerves II-XII intact without deficit.  5/5 strength in bilateral upper and lower extremities.  Speech normal.   Horizontal nystagmus.  Abnormal finger to nose.   Psychiatric:         Mood and Affect: Mood normal.         Thought Content: Thought content normal.            Results Reviewed:  I have personally reviewed most recent indicated data and agree with findings including:  [x]  Laboratory  [x]  Radiology  []  EKG/Telemetry  []  Pathology  []  Cardiac/Vascular Studies  []  Old records  []  Other:  Most pertinent findings include:      LAB RESULTS:      Lab 04/04/21  1227   WBC 5.05   HEMOGLOBIN 15.4   HEMATOCRIT 41.5   PLATELETS 78*   NEUTROS ABS 2.57   IMMATURE GRANS (ABS) 0.05   LYMPHS ABS 1.78   MONOS ABS 0.56   EOS ABS 0.08   MCV 87.9         Lab 04/04/21  1227   SODIUM 138   POTASSIUM 3.4*   CHLORIDE 102   CO2 20.0*   ANION GAP 16.0*   BUN 15   CREATININE 1.01   GLUCOSE 166*   CALCIUM 9.3   MAGNESIUM 1.6         Lab 04/04/21  1227   TOTAL PROTEIN 6.4    ALBUMIN 4.50   GLOBULIN 1.9   ALT (SGPT) 32   AST (SGOT) 28   BILIRUBIN 2.3*   ALK PHOS 48   LIPASE 20         Lab 04/04/21  1416 04/04/21  1227   PROBNP  --  164.9   TROPONIN T <0.010 <0.010                 Brief Urine Lab Results  (Last result in the past 365 days)      Color   Clarity   Blood   Leuk Est   Nitrite   Protein   CREAT   Urine HCG        04/04/21 1627 Yellow Clear Negative Negative Negative Negative             Microbiology Results (last 10 days)     ** No results found for the last 240 hours. **          CT Head Without Contrast    Result Date: 4/4/2021  EXAMINATION: CT HEAD WO CONTRAST - 04/04/2021  INDICATION: Vertigo  TECHNIQUE: 5 mm enhanced images to the brain.  The radiation dose reduction device was turned on for each scan per the ALARA (As Low as Reasonably Achievable) protocol.  COMPARISON: None.  FINDINGS: History indicates vertigo, onset while mowing. The calvarium appears intact. Included paranasal sinuses, mastoids and middle ear spaces appear clear. Soft tissue window images show age appropriate generalized cerebral atrophy. There is no evidence of infarct/edema, no evidence of hemorrhage, hydrocephalus, mass or mass effect, or abnormal extra-axial collection. No unusual focal encephalomalacia or significant white matter disease is appreciated.      Impression: Age-appropriate generalized cerebral atrophy. No evidence of acute intracranial disease is seen.  DICTATED:   04/04/2021 EDITED/ls :   04/04/2021       CT Angiogram Neck    Result Date: 4/4/2021  EXAMINATION: CT ANGIOGRAM NECK-  INDICATION: Stroke, follow up  TECHNIQUE: Spiral acquisition pre and post contrast 3 mm and 0.75 mm axial images through the neck with sagittal and coronal 2-D reconstructions and 3-D VRT and MIP angiographic reconstructions.    The radiation dose reduction device was turned on for each scan per the ALARA (As Low as Reasonably Achievable) protocol.  COMPARISON: NONE  FINDINGS: Proximal subclavian  arteries are visible appear normal in caliber.  On the right, no hemodynamically significant common carotid stenosis is seen. There is fairly dense carotid bulb plaquing but no evidence of hemodynamically significant stenosis. No ICA or ECA stenosis is seen to the level of skull base.  On the left, no evidence of hemodynamically significant common internal, or external carotid artery stenosis is seen. There is relatively milder calcified plaquing of the common carotid bulb.  Vertebral arteries appear codominant. Attenuated appearance of the mid right vertebral artery at the level of C4 appears to be due to dense streak artifact from the patient's dental appliances. Left vertebral artery is partly obscured by streak artifact at this same level. Vertebral arteries elsewhere appear normal.  No significant incidental soft tissue neck pathology is appreciated. Included lung apices appear grossly clear.        Impression: Mildly limited evaluation of the mid vertebral arteries due to streak artifact from patient's dental appliances. Allowing for this, no evidence of hemodynamically significant carotid or vertebral artery stenosis in the neck. Bilateral carotid bulb calcification incidentally noted.       XR Chest 1 View    Result Date: 4/4/2021  EXAMINATION: XR CHEST 1 VW-  INDICATION: Chest Pain triage protocol  COMPARISON: NONE  FINDINGS: Heart mediastinum and pulmonary vasculature appear within normal limits. Lungs appear well-expanded and grossly clear. Slight coarsening of basilar interstitial markings is probably chronic. No lung consolidation effusion or pneumothorax is seen.       Impression: Mild chronic appearing changes of the lower lungs. No clearly acute chest disease is identified.        CT Angiogram Head    Result Date: 4/4/2021  EXAMINATION: CT ANGIOGRAM HEAD-  INDICATION: Dizziness    TECHNIQUE: pre and postcontrast 3 mm and 0.75 mm axial images through the brain with 2-D and 3-D angiographic  reconstructions.    The radiation dose reduction device was turned on for each scan per the ALARA (As Low as Reasonably Achievable) protocol.  COMPARISON: NONE  FINDINGS: Distal vertebral arteries, basilar artery, and posterior cerebral arteries appear within normal limits. Intracranial portions of the internal carotid arteries appear normal in caliber with no significant calcification. Anterior and middle cerebral arteries and proximal branches appear within normal limits. No evidence of hemodynamically significant intracranial vascular stenosis.        Impression: Negative CTA of the head. No evidence of hemodynamically significant intracranial vascular stenosis.       CT Cerebral Perfusion With & Without Contrast    Result Date: 4/4/2021  EXAMINATION: CT CEREBRAL PERFUSION W WO CONTRAST-  INDICATION: Neuro deficit, acute, stroke suspected  TECHNIQUE: Cerebral perfusion analysis was performed using computed tomography with IV contrast administration including postprocessing of parametric maps with determination of cerebral blood flow, cerebral blood volume, mean transit time and time to drain and T2-Max.  The radiation dose reduction device was turned on for each scan per the ALARA (As Low as Reasonably Achievable) protocol.  COMPARISON: Unenhanced head CT scan of same date  FINDINGS: Rapid analysis is negative with no areas of the brain showing T-Max greater than six seconds or cerebral blood flow less than 30%. Individual perfusion maps do not appear to show a consistent focal perfusion abnormality to suggest focal ischemia or infarct.        Impression: Negative perfusion scan.             Assessment/Plan   Assessment & Plan       Vertigo    Tucker Johnson is a 71 y.o. male with medical history significant for, allergies, GERD, JERE corrected by surgery, tobacco abuse who presents with dizziness.  The patient reports around 11:00 today, 4/4 he began having dizziness.  He reports he was mowing the lawn and had a  sudden onset of spinning sensation.  He walked to the garage and sat down.  He began to feel worse and ultimately laid down on the ground.  He had water nearby and started taking some sips of water.  He was able to crawl into the house and have his wife call 911.  He states he felt extremely nauseated and short of breath during that time.  He also reported bilateral hand numbness, left greater than right.  At this time currently in the ER, he feels much better and denies shortness of breath, dyspnea, chest pain, melena, hematochezia, dysuria.  He denies any recent chest pain, dizziness or other history of symptoms like this.       He is former smoker.  He drinks 3 liquor drinks daily. Upon arrival to ER, his vital signs were stable however his O2 level dropped to 86% and he had to be placed on oxygen.  His head CT shows age appropriate generalized cerebral atrophy without evidence of acute intracranial disease.  Xray shows mild chronic appearing changes of lower lungs.    Dizziness  -only deficit was finger to nose  -positive nystagmus on exam  -CT negative  -MRI shows no evidence of acute infarct or other intracranial disease  -ECHO ordered  -Lipid panel  -Hemoglobin A1C  -Stroke Neuro evaluated in the ER  -PT/OT      HLD  -on lipitor 20mg at home  -will change to lipitor 80mg for now      GERD   -pantoprazole 40mg     Arthritis   -hold Voltaren gel    New oxygen requirement   -O2 went down to 86% in ER and he was placed on 2L.  -monitor    ETOH abuse  - likely not needed, but CIWA added for precaution    DVT prophylaxis:  Mechanical       CODE STATUS:    Code Status and Medical Interventions:   Ordered at: 04/04/21 8853     Level Of Support Discussed With:    Patient     Code Status:    CPR     Medical Interventions (Level of Support Prior to Arrest):    Full       Attending   Admission Attestation       I have seen and examined the patient, performing an independent face-to-face diagnostic evaluation with plan of  "care reviewed and developed with the advanced practice clinician (APC).      Brief Summary Statement:   Tcuker Johnson is a 71 y.o. male with past medical history significant for tobacco and alcohol use, admitted for new onset vertigo that started while outside mowing the lawn.  Patient states he had been using his push mower and then suddenly felt weak and dizzy.  He denies any physical activity in the last several months secondary to the weather and this was the first time he had exerted himself significantly.  His wife is at bedside.  He is somewhat of a poor historian at this time secondary to several doses of sedation used to treat his vertigo.  He was previously evaluated by stroke navigator so far work-up is negative.  Echo is still pending (add at least 4 elements of HPI for extended history for Level 3 billing)    Remainder of detailed HPI is as noted by APC and has been reviewed and/or edited by me for completeness.    Attending Physical Exam:  Constitutional: Awake, alert  Eyes: PERRLA, sclerae anicteric, no conjunctival injection  HENT: NCAT, mucous membranes moist  Neck: Supple, trachea midline  Respiratory: Clear to auscultation bilaterally, nonlabored respirations   Cardiovascular: RRR, no murmurs, palpable pedal pulses bilaterally  Gastrointestinal: Positive bowel sounds, soft, nontender, nondistended  Musculoskeletal: No bilateral ankle edema, no clubbing or cyanosis to extremities  Psychiatric: Appropriate affect, cooperative  Neurologic: Oriented x 3, speech slow but clear  Skin: No rashes (consider inserting and editing \".LEXEXAMHP\" for qualifying comprehensive exam for Level 3 billing)    Brief Assessment/Plan :  See detailed assessment and plan developed with APC which I have reviewed and/or edited for completeness.        Admission Status: I believe that this patient meets INPATIENT status due to vertigo, concern for possible CVA.  I feel patient’s risk for adverse outcomes and need for care " warrant INPATIENT evaluation and I predict the patient’s care encounter to likely last beyond 2 midnights.      Mary Martínez, DO  04/04/21              This note has been completed as part of a split-shared workflow.     Signature: Electronically signed by Jade Smith PA-C, 04/04/21, 4:45 PM EDT

## 2021-04-05 ENCOUNTER — READMISSION MANAGEMENT (OUTPATIENT)
Dept: CALL CENTER | Facility: HOSPITAL | Age: 71
End: 2021-04-05

## 2021-04-05 ENCOUNTER — APPOINTMENT (OUTPATIENT)
Dept: CARDIOLOGY | Facility: HOSPITAL | Age: 71
End: 2021-04-05

## 2021-04-05 VITALS
HEIGHT: 72 IN | RESPIRATION RATE: 16 BRPM | TEMPERATURE: 97.9 F | WEIGHT: 186 LBS | HEART RATE: 74 BPM | DIASTOLIC BLOOD PRESSURE: 89 MMHG | OXYGEN SATURATION: 97 % | SYSTOLIC BLOOD PRESSURE: 133 MMHG | BODY MASS INDEX: 25.19 KG/M2

## 2021-04-05 PROBLEM — E78.5 HYPERLIPIDEMIA: Status: ACTIVE | Noted: 2021-04-05

## 2021-04-05 PROBLEM — D69.6 THROMBOCYTOPENIA: Chronic | Status: ACTIVE | Noted: 2021-04-05

## 2021-04-05 PROBLEM — G47.33 OSA (OBSTRUCTIVE SLEEP APNEA): Status: ACTIVE | Noted: 2021-04-05

## 2021-04-05 PROBLEM — H81.10 BPPV (BENIGN PAROXYSMAL POSITIONAL VERTIGO): Status: ACTIVE | Noted: 2021-04-04

## 2021-04-05 PROBLEM — K21.9 GERD (GASTROESOPHAGEAL REFLUX DISEASE): Status: ACTIVE | Noted: 2021-04-05

## 2021-04-05 LAB
ALBUMIN SERPL-MCNC: 4.9 G/DL (ref 3.5–5.2)
ANION GAP SERPL CALCULATED.3IONS-SCNC: 10 MMOL/L (ref 5–15)
BASOPHILS # BLD AUTO: 0.01 10*3/MM3 (ref 0–0.2)
BASOPHILS NFR BLD AUTO: 0.2 % (ref 0–1.5)
BH CV ECHO MEAS - AI DEC SLOPE: 162.1 CM/SEC^2
BH CV ECHO MEAS - AI MAX PG: 47.6 MMHG
BH CV ECHO MEAS - AI MAX VEL: 344.8 CM/SEC
BH CV ECHO MEAS - AI P1/2T: 623.1 MSEC
BH CV ECHO MEAS - AO MAX PG (FULL): 2.1 MMHG
BH CV ECHO MEAS - AO MAX PG: 4.8 MMHG
BH CV ECHO MEAS - AO MEAN PG (FULL): 1.1 MMHG
BH CV ECHO MEAS - AO MEAN PG: 2.7 MMHG
BH CV ECHO MEAS - AO ROOT AREA (BSA CORRECTED): 2
BH CV ECHO MEAS - AO ROOT AREA: 12.6 CM^2
BH CV ECHO MEAS - AO ROOT DIAM: 4.1 CM
BH CV ECHO MEAS - AO V2 MAX: 109.7 CM/SEC
BH CV ECHO MEAS - AO V2 MEAN: 78.1 CM/SEC
BH CV ECHO MEAS - AO V2 VTI: 20.8 CM
BH CV ECHO MEAS - ASC AORTA: 4.1 CM
BH CV ECHO MEAS - AVA(I,A): 2.8 CM^2
BH CV ECHO MEAS - AVA(I,D): 2.8 CM^2
BH CV ECHO MEAS - AVA(V,A): 2.3 CM^2
BH CV ECHO MEAS - AVA(V,D): 2.3 CM^2
BH CV ECHO MEAS - BSA(HAYCOCK): 2.1 M^2
BH CV ECHO MEAS - BSA: 2.1 M^2
BH CV ECHO MEAS - BZI_BMI: 25.2 KILOGRAMS/M^2
BH CV ECHO MEAS - BZI_METRIC_HEIGHT: 182.9 CM
BH CV ECHO MEAS - BZI_METRIC_WEIGHT: 84.4 KG
BH CV ECHO MEAS - EDV(CUBED): 55.4 ML
BH CV ECHO MEAS - EDV(MOD-SP2): 153 ML
BH CV ECHO MEAS - EDV(MOD-SP4): 115 ML
BH CV ECHO MEAS - EDV(TEICH): 62.4 ML
BH CV ECHO MEAS - EF(CUBED): 62.5 %
BH CV ECHO MEAS - EF(MOD-BP): 61 %
BH CV ECHO MEAS - EF(MOD-SP2): 62.7 %
BH CV ECHO MEAS - EF(MOD-SP4): 59.1 %
BH CV ECHO MEAS - EF(TEICH): 54.8 %
BH CV ECHO MEAS - ESV(CUBED): 20.8 ML
BH CV ECHO MEAS - ESV(MOD-SP2): 57 ML
BH CV ECHO MEAS - ESV(MOD-SP4): 47 ML
BH CV ECHO MEAS - ESV(TEICH): 28.3 ML
BH CV ECHO MEAS - FS: 27.9 %
BH CV ECHO MEAS - IVS/LVPW: 0.86
BH CV ECHO MEAS - IVSD: 0.95 CM
BH CV ECHO MEAS - LA DIMENSION: 3.3 CM
BH CV ECHO MEAS - LA/AO: 0.82
BH CV ECHO MEAS - LAD MAJOR: 4.6 CM
BH CV ECHO MEAS - LAT PEAK E' VEL: 7.9 CM/SEC
BH CV ECHO MEAS - LATERAL E/E' RATIO: 4.5
BH CV ECHO MEAS - LV DIASTOLIC VOL/BSA (35-75): 55.7 ML/M^2
BH CV ECHO MEAS - LV MASS(C)D: 122.6 GRAMS
BH CV ECHO MEAS - LV MASS(C)DI: 59.4 GRAMS/M^2
BH CV ECHO MEAS - LV MAX PG: 2.7 MMHG
BH CV ECHO MEAS - LV MEAN PG: 1.5 MMHG
BH CV ECHO MEAS - LV SYSTOLIC VOL/BSA (12-30): 22.8 ML/M^2
BH CV ECHO MEAS - LV V1 MAX: 81.9 CM/SEC
BH CV ECHO MEAS - LV V1 MEAN: 59.2 CM/SEC
BH CV ECHO MEAS - LV V1 VTI: 19.2 CM
BH CV ECHO MEAS - LVIDD: 3.8 CM
BH CV ECHO MEAS - LVIDS: 2.7 CM
BH CV ECHO MEAS - LVLD AP2: 8.4 CM
BH CV ECHO MEAS - LVLD AP4: 8.6 CM
BH CV ECHO MEAS - LVLS AP2: 7.2 CM
BH CV ECHO MEAS - LVLS AP4: 7.1 CM
BH CV ECHO MEAS - LVOT AREA (M): 3.1 CM^2
BH CV ECHO MEAS - LVOT AREA: 3.1 CM^2
BH CV ECHO MEAS - LVOT DIAM: 2 CM
BH CV ECHO MEAS - LVPWD: 1.1 CM
BH CV ECHO MEAS - MED PEAK E' VEL: 7.1 CM/SEC
BH CV ECHO MEAS - MEDIAL E/E' RATIO: 5
BH CV ECHO MEAS - MV A MAX VEL: 79.5 CM/SEC
BH CV ECHO MEAS - MV DEC TIME: 0.26 SEC
BH CV ECHO MEAS - MV E MAX VEL: 36.7 CM/SEC
BH CV ECHO MEAS - MV E/A: 0.46
BH CV ECHO MEAS - MV MAX PG: 7.2 MMHG
BH CV ECHO MEAS - MV MEAN PG: 2.8 MMHG
BH CV ECHO MEAS - MV V2 MAX: 134.2 CM/SEC
BH CV ECHO MEAS - MV V2 MEAN: 73.4 CM/SEC
BH CV ECHO MEAS - MV V2 VTI: 30.2 CM
BH CV ECHO MEAS - MVA(VTI): 1.9 CM^2
BH CV ECHO MEAS - SI(AO): 126.9 ML/M^2
BH CV ECHO MEAS - SI(CUBED): 16.8 ML/M^2
BH CV ECHO MEAS - SI(LVOT): 28.5 ML/M^2
BH CV ECHO MEAS - SI(MOD-SP2): 46.5 ML/M^2
BH CV ECHO MEAS - SI(MOD-SP4): 32.9 ML/M^2
BH CV ECHO MEAS - SI(TEICH): 16.6 ML/M^2
BH CV ECHO MEAS - SV(AO): 262.1 ML
BH CV ECHO MEAS - SV(CUBED): 34.6 ML
BH CV ECHO MEAS - SV(LVOT): 58.9 ML
BH CV ECHO MEAS - SV(MOD-SP2): 96 ML
BH CV ECHO MEAS - SV(MOD-SP4): 68 ML
BH CV ECHO MEAS - SV(TEICH): 34.2 ML
BH CV ECHO MEAS - TAPSE (>1.6): 2.3 CM
BH CV ECHO MEASUREMENTS AVERAGE E/E' RATIO: 4.89
BH CV XLRA - RV BASE: 3.6 CM
BH CV XLRA - RV LENGTH: 6.4 CM
BH CV XLRA - RV MID: 2.4 CM
BH CV XLRA - TDI S': 13.2 CM/SEC
BUN SERPL-MCNC: 11 MG/DL (ref 8–23)
BUN/CREAT SERPL: 11.6 (ref 7–25)
CALCIUM SPEC-SCNC: 9.5 MG/DL (ref 8.6–10.5)
CHLORIDE SERPL-SCNC: 103 MMOL/L (ref 98–107)
CHOLEST SERPL-MCNC: 170 MG/DL (ref 0–200)
CO2 SERPL-SCNC: 24 MMOL/L (ref 22–29)
CREAT SERPL-MCNC: 0.95 MG/DL (ref 0.76–1.27)
DEPRECATED RDW RBC AUTO: 41.1 FL (ref 37–54)
EOSINOPHIL # BLD AUTO: 0.06 10*3/MM3 (ref 0–0.4)
EOSINOPHIL NFR BLD AUTO: 1 % (ref 0.3–6.2)
ERYTHROCYTE [DISTWIDTH] IN BLOOD BY AUTOMATED COUNT: 12 % (ref 12.3–15.4)
GFR SERPL CREATININE-BSD FRML MDRD: 78 ML/MIN/1.73
GLUCOSE BLDC GLUCOMTR-MCNC: 106 MG/DL (ref 70–130)
GLUCOSE SERPL-MCNC: 105 MG/DL (ref 65–99)
HBA1C MFR BLD: 5.2 % (ref 4.8–5.6)
HCT VFR BLD AUTO: 46.4 % (ref 37.5–51)
HDLC SERPL-MCNC: 100 MG/DL (ref 40–60)
HGB BLD-MCNC: 16.3 G/DL (ref 13–17.7)
IMM GRANULOCYTES # BLD AUTO: 0.01 10*3/MM3 (ref 0–0.05)
IMM GRANULOCYTES NFR BLD AUTO: 0.2 % (ref 0–0.5)
LDLC SERPL CALC-MCNC: 57 MG/DL (ref 0–100)
LDLC/HDLC SERPL: 0.57 {RATIO}
LEFT ATRIUM VOLUME INDEX: 16.9 ML/M^2
LEFT ATRIUM VOLUME: 35 ML
LYMPHOCYTES # BLD AUTO: 1.91 10*3/MM3 (ref 0.7–3.1)
LYMPHOCYTES NFR BLD AUTO: 30.7 % (ref 19.6–45.3)
MAGNESIUM SERPL-MCNC: 3.1 MG/DL (ref 1.6–2.4)
MAXIMAL PREDICTED HEART RATE: 147 BPM
MCH RBC QN AUTO: 32.6 PG (ref 26.6–33)
MCHC RBC AUTO-ENTMCNC: 35.1 G/DL (ref 31.5–35.7)
MCV RBC AUTO: 92.8 FL (ref 79–97)
MONOCYTES # BLD AUTO: 0.62 10*3/MM3 (ref 0.1–0.9)
MONOCYTES NFR BLD AUTO: 10 % (ref 5–12)
NEUTROPHILS NFR BLD AUTO: 3.61 10*3/MM3 (ref 1.7–7)
NEUTROPHILS NFR BLD AUTO: 57.9 % (ref 42.7–76)
NRBC BLD AUTO-RTO: 0 /100 WBC (ref 0–0.2)
PHOSPHATE SERPL-MCNC: 3.7 MG/DL (ref 2.5–4.5)
PLATELET # BLD AUTO: 87 10*3/MM3 (ref 140–450)
PMV BLD AUTO: 10.4 FL (ref 6–12)
POTASSIUM SERPL-SCNC: 4.8 MMOL/L (ref 3.5–5.2)
RBC # BLD AUTO: 5 10*6/MM3 (ref 4.14–5.8)
SODIUM SERPL-SCNC: 137 MMOL/L (ref 136–145)
STRESS TARGET HR: 125 BPM
TRIGL SERPL-MCNC: 66 MG/DL (ref 0–150)
VLDLC SERPL-MCNC: 13 MG/DL (ref 5–40)
WBC # BLD AUTO: 6.22 10*3/MM3 (ref 3.4–10.8)

## 2021-04-05 PROCEDURE — G0378 HOSPITAL OBSERVATION PER HR: HCPCS

## 2021-04-05 PROCEDURE — 97530 THERAPEUTIC ACTIVITIES: CPT

## 2021-04-05 PROCEDURE — 83036 HEMOGLOBIN GLYCOSYLATED A1C: CPT | Performed by: PHYSICIAN ASSISTANT

## 2021-04-05 PROCEDURE — 93306 TTE W/DOPPLER COMPLETE: CPT | Performed by: INTERNAL MEDICINE

## 2021-04-05 PROCEDURE — 92523 SPEECH SOUND LANG COMPREHEN: CPT

## 2021-04-05 PROCEDURE — 25010000003 POTASSIUM CHLORIDE 10 MEQ/100ML SOLUTION: Performed by: INTERNAL MEDICINE

## 2021-04-05 PROCEDURE — 85025 COMPLETE CBC W/AUTO DIFF WBC: CPT | Performed by: INTERNAL MEDICINE

## 2021-04-05 PROCEDURE — 97165 OT EVAL LOW COMPLEX 30 MIN: CPT | Performed by: OCCUPATIONAL THERAPIST

## 2021-04-05 PROCEDURE — 93306 TTE W/DOPPLER COMPLETE: CPT

## 2021-04-05 PROCEDURE — 80069 RENAL FUNCTION PANEL: CPT | Performed by: INTERNAL MEDICINE

## 2021-04-05 PROCEDURE — 82962 GLUCOSE BLOOD TEST: CPT

## 2021-04-05 PROCEDURE — 97161 PT EVAL LOW COMPLEX 20 MIN: CPT

## 2021-04-05 PROCEDURE — 99217 PR OBSERVATION CARE DISCHARGE MANAGEMENT: CPT | Performed by: HOSPITALIST

## 2021-04-05 PROCEDURE — 99214 OFFICE O/P EST MOD 30 MIN: CPT | Performed by: PSYCHIATRY & NEUROLOGY

## 2021-04-05 PROCEDURE — 83735 ASSAY OF MAGNESIUM: CPT | Performed by: INTERNAL MEDICINE

## 2021-04-05 PROCEDURE — 80061 LIPID PANEL: CPT | Performed by: PHYSICIAN ASSISTANT

## 2021-04-05 RX ORDER — MECLIZINE HYDROCHLORIDE 25 MG/1
25 TABLET ORAL 3 TIMES DAILY PRN
Status: DISCONTINUED | OUTPATIENT
Start: 2021-04-05 | End: 2021-04-05 | Stop reason: HOSPADM

## 2021-04-05 RX ORDER — ONDANSETRON 4 MG/1
4 TABLET, ORALLY DISINTEGRATING ORAL EVERY 8 HOURS PRN
Qty: 15 TABLET | Refills: 0 | Status: SHIPPED | OUTPATIENT
Start: 2021-04-05 | End: 2022-01-13

## 2021-04-05 RX ORDER — ACETAMINOPHEN 325 MG/1
650 TABLET ORAL EVERY 6 HOURS PRN
Status: DISCONTINUED | OUTPATIENT
Start: 2021-04-05 | End: 2021-04-05 | Stop reason: HOSPADM

## 2021-04-05 RX ORDER — MECLIZINE HYDROCHLORIDE 25 MG/1
25 TABLET ORAL 3 TIMES DAILY PRN
Qty: 15 TABLET | Refills: 0 | Status: SHIPPED | OUTPATIENT
Start: 2021-04-05 | End: 2021-04-12 | Stop reason: SDUPTHER

## 2021-04-05 RX ADMIN — PANTOPRAZOLE SODIUM 40 MG: 40 TABLET, DELAYED RELEASE ORAL at 07:51

## 2021-04-05 RX ADMIN — POTASSIUM CHLORIDE 10 MEQ: 7.46 INJECTION, SOLUTION INTRAVENOUS at 01:28

## 2021-04-05 RX ADMIN — POTASSIUM CHLORIDE 10 MEQ: 7.46 INJECTION, SOLUTION INTRAVENOUS at 02:46

## 2021-04-05 RX ADMIN — ACETAMINOPHEN 650 MG: 325 TABLET, FILM COATED ORAL at 14:44

## 2021-04-05 NOTE — PLAN OF CARE
Problem: Adult Inpatient Plan of Care  Goal: Plan of Care Review  Recent Flowsheet Documentation  Taken 4/5/2021 0845 by Zara Lozada OT  Progress: no change  Plan of Care Reviewed With:   patient   spouse  Outcome Summary: OT IE completed. Pt. supine in bed with towel roll at neck. Pt. reporting 1/10 dizziness in supine. Pt. log rolled onto side with v/c's & dizziness increased to 3/10. Sidelying to sit with v/c's. Bed mobility: SUP, LBD: max A, grooming: set up, STS: CGA using RW, ambulated to door & back to EOB: CGA using RW, sit to sidelying: SUP & v/c's. Pt. is appropriate for OT skilled services to address decreased ADL & functional mobility indep. Recommend home with assist & OP Vestibular therapy upon d/c.   Goal Outcome Evaluation:  Plan of Care Reviewed With: patient, spouse  Progress: no change  Outcome Summary: OT IE completed. Pt. supine in bed with towel roll at neck. Pt. reporting 1/10 dizziness in supine. Pt. log rolled onto side with v/c's & dizziness increased to 3/10. Sidelying to sit with v/c's. Bed mobility: SUP, LBD: max A, grooming: set up, STS: CGA using RW, ambulated to door & back to EOB: CGA using RW, sit to sidelying: SUP & v/c's. Pt. is appropriate for OT skilled services to address decreased ADL & functional mobility indep. Recommend home with assist & OP Vestibular therapy upon d/c.

## 2021-04-05 NOTE — THERAPY EVALUATION
Acute Care - Speech Language Pathology Initial Evaluation  [facility]   Cognitive-Communication Evaluation       Patient Name: Tucker Johnson  : 1950  MRN: [id number]  Today's Date: 2021               Admit Date: 2021     Visit Dx:    ICD-10-CM ICD-9-CM   1. Vertigo  R42 780.4   2. Impaired mobility and ADLs  Z74.09 V49.89    Z78.9      Patient Active Problem List   Diagnosis   • Vertigo   • Thrombocytopenia (CMS/HCC)   • JERE (obstructive sleep apnea)   • GERD (gastroesophageal reflux disease)   • Hyperlipidemia     Past Medical History:   Diagnosis Date   • Allergic    • Arthritis    • GERD (gastroesophageal reflux disease)    • Hyperlipidemia    • JERE (obstructive sleep apnea)     treated with surgery   • Tobacco abuse      Past Surgical History:   Procedure Laterality Date   • ELBOW ARTHROPLASTY     • SHOULDER SURGERY     • UVULOPALATOPHARYNGOPLASTY          SLP EVALUATION (last 72 hours)      SLP SLC Evaluation     Row Name 21 1030                   Communication Assessment/Intervention    Document Type  evaluation  -DV        Subjective Information  no complaints  -DV        Patient Observations  alert;cooperative  -DV        Patient/Family/Caregiver Comments/Observations  wife present  -DV        Care Plan Review, Other Participant(s)  spouse  -DV        Patient Effort  excellent  -DV        Symptoms Noted During/After Treatment  dizziness  -DV           General Information    Patient Profile Reviewed  yes  -DV        Pertinent History Of Current Problem  71yoM admitted with dizziness, SOA, and bilateral hand numbness. CT/MRI negative for acute abnormality.  -DV        Precautions/Limitations, Vision  WFL;for purposes of eval  -DV        Precautions/Limitations, Hearing  WFL;for purposes of eval  -DV        Prior Level of Function-Communication  WFL  -DV        Plans/Goals Discussed with  patient;spouse/S.O.;agreed upon  -DV        Barriers to Rehab  none identified  -DV         Patient's Goals for Discharge  patient did not state  -DV        Family Goals for Discharge  family did not state  -DV           Pain    Additional Documentation  Pain Scale: Numbers Pre/Post-Treatment (Group)  -DV           Pain Scale: Numbers Pre/Post-Treatment    Pretreatment Pain Rating  0/10 - no pain  -DV        Posttreatment Pain Rating  0/10 - no pain  -DV           Comprehension Assessment/Intervention    Comprehension Assessment/Intervention  Auditory Comprehension  -DV           Auditory Comprehension Assessment/Intervention    Auditory Comprehension (Communication)  WFL  -DV        Answers Questions (Communication)  WFL  -DV        Able to Follow Commands (Communication)  WFL  -DV        Narrative Discourse  WFL  -DV           Expression Assessment/Intervention    Expression Assessment/Intervention  verbal expression  -DV           Verbal Expression Assessment/Intervention    Verbal Expression  WFL  -DV        Spontaneous/Functional Words  WFL  -DV        Sentence Formulation  WFL  -DV        Conversational Discourse/Fluency  WFL  -DV           Motor Speech Assessment/Intervention    Motor Speech Function  WFL  -DV           Cursory Voice Assessment/Intervention    Quality and Resonance (Voice)  WFL  -DV           Cognitive Assessment Intervention- SLP    Cognitive Function (Cognition)  WFL  -DV        Orientation Status (Cognition)  WFL  -DV        Memory (Cognitive)  WFL  -DV        Attention (Cognitive)  WFL  -DV        Thought Organization (Cognitive)  WFL  -DV        Reasoning (Cognitive)  WFL  -DV        Problem Solving (Cognitive)  WFL  -DV        Executive Function (Cognition)  WFL  -DV        Pragmatics (Communication)  WFL  -DV           SLP Clinical Impressions    SLP Diagnosis  Pt presents with functional speech, language, and cognitive-communication skills. Pt/wife report no changes.  -DV        Rehab Potential/Prognosis  excellent  -DV        SLC Criteria for Skilled Therapy Interventions  Met  no problems identified which require skilled intervention  -DV        Functional Impact  no impact on function  -DV           Recommendations    Therapy Frequency (SLP SLC)  evaluation only  -DV          User Key  (r) = Recorded By, (t) = Taken By, (c) = Cosigned By    Initials Name Effective Dates    Estela Gustafson MS CCC-SLP 02/28/20 -              EDUCATION  The patient has been educated in the following areas:     Cognitive Impairment Communication Impairment.    SLP Recommendation and Plan  SLP Diagnosis: Pt presents with functional speech, language, and cognitive-communication skills. Pt/wife report no changes.        SLC Criteria for Skilled Therapy Interventions Met: no problems identified which require skilled intervention                               Plan of Care Reviewed With: patient, spouse                  Time Calculation:     Time Calculation- SLP     Row Name 04/05/21 1147             Time Calculation- SLP    SLP Start Time  1030  -DV      SLP Received On  04/05/21  -DV        User Key  (r) = Recorded By, (t) = Taken By, (c) = Cosigned By    Initials Name Provider Type    Estela Gustafson MS CCC-SLP Speech and Language Pathologist          Therapy Charges for Today     Code Description Service Date Service Provider Modifiers Qty    [id number] HC ST EVAL SPEECH AND PROD W LANG  2 4/5/2021 Estela Rubi MS CCC-SLP GN 1                Patient was not wearing a face mask and did not exhibit coughing during this therapy encounter.  Procedure performed was not aerosolizing, did not involve close contact (within 6 feet for at least 15 minutes or longer), and did not involve contact with infectious secretions or specimens.  Therapist used appropriate personal protective equipment including gloves and standard procedure mask.  Appropriate PPE was worn during the entire therapy session.  Hand hygiene was completed before and after therapy session.         MS NEL De La Cruz  4/5/2021

## 2021-04-05 NOTE — PLAN OF CARE
Problem: Adult Inpatient Plan of Care  Goal: Plan of Care Review  Outcome: Ongoing, Progressing  Flowsheets (Taken 4/5/2021 0841)  Progress: improving  Plan of Care Reviewed With: patient  Outcome Summary: Patient being discharged home today   Goal Outcome Evaluation:  Plan of Care Reviewed With: patient  Progress: improving  Outcome Summary: Patient being discharged home today

## 2021-04-05 NOTE — PLAN OF CARE
Problem: Adult Inpatient Plan of Care  Goal: Plan of Care Review  Outcome: Ongoing, Progressing  Flowsheets (Taken 4/5/2021 1030)  Plan of Care Reviewed With:   patient   spouse   Goal Outcome Evaluation:  Plan of Care Reviewed With: patient, spouse      SLP evaluation completed. Will sign-off. Please see note for further details and recommendations.

## 2021-04-05 NOTE — THERAPY EVALUATION
Patient Name: Tucker Johnson  : 1950    MRN: [id number]                              Today's Date: 2021       Admit Date: 2021    Visit Dx:     ICD-10-CM ICD-9-CM   1. Vertigo  R42 780.4   2. Impaired mobility and ADLs  Z74.09 V49.89    Z78.9      Patient Active Problem List   Diagnosis   • Vertigo   • Thrombocytopenia (CMS/HCC)   • JERE (obstructive sleep apnea)   • GERD (gastroesophageal reflux disease)   • Hyperlipidemia     Past Medical History:   Diagnosis Date   • Allergic    • Arthritis    • GERD (gastroesophageal reflux disease)    • Hyperlipidemia    • JERE (obstructive sleep apnea)     treated with surgery   • Tobacco abuse      Past Surgical History:   Procedure Laterality Date   • ELBOW ARTHROPLASTY     • SHOULDER SURGERY     • UVULOPALATOPHARYNGOPLASTY       General Information     Row Name 21 1128          Physical Therapy Time and Intention    Document Type  evaluation  -LO     Mode of Treatment  physical therapy  -LO     Row Name 21 1128          General Information    Patient Profile Reviewed  yes  -LO     Prior Level of Function  independent:;transfer;gait;community mobility;work;shopping;driving;home management  -LO     Existing Precautions/Restrictions  fall  -LO     Barriers to Rehab  none identified  -LO     Row Name 21 1128          Living Environment    Lives With  spouse  -LO     Row Name 21 1128          Home Main Entrance    Number of Stairs, Main Entrance  none  -LO     Row Name 21 1128          Stairs Within Home, Primary    Number of Stairs, Within Home, Primary  none  -LO     Row Name 21 1128          Cognition    Orientation Status (Cognition)  oriented x 4  -LO     Row Name 21 1128          Safety Issues, Functional Mobility    Safety Issues Affecting Function (Mobility)  safety precautions follow-through/compliance  -LO     Impairments Affecting Function (Mobility)  balance;endurance/activity tolerance;strength  -LO      Comment, Safety Issues/Impairments (Mobility)  dizziness 3/10, nausea increase in standing  -LO       User Key  (r) = Recorded By, (t) = Taken By, (c) = Cosigned By    Initials Name Provider Type    Meghan Dowell PT Physical Therapist        Mobility     Row Name 04/05/21 1129          Bed Mobility    Bed Mobility  rolling right;rolling left;scooting/bridging;sidelying-sit-sidelying  -LO     Rolling Left [place] (Bed Mobility)  modified independence  -LO     Rolling Right [place] (Bed Mobility)  modified independence  -LO     Scooting/Bridging [place] (Bed Mobility)  modified independence  -LO     Sidelying-Sit [place] (Bed Mobility)  modified independence;verbal cues  -LO     Sidelying-Sit-Sidelying [place] (Bed Mobility)  modified independence;verbal cues  -LO     Assistive Device (Bed Mobility)  bed rails  -LO     Comment (Bed Mobility)  vc for log rolling to keep head in neutral  -LO     Row Name 04/05/21 1129          Transfers    Comment (Transfers)  CGA with no AD  -LO     Row Name 04/05/21 1129          Bed-Chair Transfer    Bed-Chair [place] (Transfers)  contact guard  -LO     Assistive Device (Bed-Chair Transfers)  other (see comments) none  -LO     Row Name 04/05/21 1129          Sit-Stand Transfer    Sit-Stand [place] (Transfers)  contact guard;verbal cues  -LO     Assistive Device (Sit-Stand Transfers)  other (see comments) none  -LO     Row Name 04/05/21 1129          Gait/Stairs (Locomotion)    Comment (Gait/Stairs)  deferred gait due to nausea/fatigue from hallpike and epley maneuver  -LO       User Key  (r) = Recorded By, (t) = Taken By, (c) = Cosigned By    Initials Name Provider Type    Meghan Dowell PT Physical Therapist        Obj/Interventions     Row Name 04/05/21 1133          Range of Motion Comprehensive    General Range of Motion  bilateral lower extremity ROM WNL  -LO     Row Name 04/05/21 1133          Strength Comprehensive (MMT)    General  Manual Muscle Testing (MMT) Assessment  no strength deficits identified  -     Comment, General Manual Muscle Testing (MMT) Assessment  BLE grossly 5/5  -     Row Name 04/05/21 1133          Balance    Balance Assessment  sitting static balance;sitting dynamic balance;standing static balance;standing dynamic balance  -     Static Sitting Balance  WNL;supported;sitting, edge of bed  -LO     Dynamic Sitting Balance  WNL;supported;sitting, edge of bed  -LO     Static Standing Balance  mild impairment;unsupported;standing  -LO     Dynamic Standing Balance  mild impairment;unsupported;standing  -LO     Comment, Balance  Hallpike maneuver R side then L with corrective Epley maneuver. No increase in sx and no nystagmus to right. Mild increase in nausea following corrective maneuver to the L, but again no nystagmus.  -     Row Name 04/05/21 1133          Sensory Assessment (Somatosensory)    Sensory Assessment (Somatosensory)  sensation intact  -       User Key  (r) = Recorded By, (t) = Taken By, (c) = Cosigned By    Initials Name Provider Type    Meghan Dowell, PT Physical Therapist        Goals/Plan     Row Name 04/05/21 1141          Bed Mobility Goal 1 (PT)    Activity/Assistive Device (Bed Mobility Goal 1, PT)  rolling to left;rolling to right  -LO     [place] Level/Cues Needed (Bed Mobility Goal 1, PT)  independent  -LO     Time Frame (Bed Mobility Goal 1, PT)  long term goal (LTG);10 days  -     Row Name 04/05/21 1141          Transfer Goal 1 (PT)    Activity/Assistive Device (Transfer Goal 1, PT)  sit-to-stand/stand-to-sit;bed-to-chair/chair-to-bed;toilet;car transfer  -LO     [place] Level/Cues Needed (Transfer Goal 1, PT)  independent  -LO     Time Frame (Transfer Goal 1, PT)  long term goal (LTG);10 days  -     Row Name 04/05/21 1141          Gait Training Goal 1 (PT)    Activity/Assistive Device (Gait Training Goal 1, PT)  gait (walking locomotion);decrease fall risk  -      [place] Level (Gait Training Goal 1, PT)  independent  -LO     Distance (Gait Training Goal 1, PT)  300'  -LO     Time Frame (Gait Training Goal 1, PT)  long term goal (LTG);10 days  -LO     Row Name 04/05/21 1141          Patient Education Goal (PT)    Activity (Patient Education Goal, PT)  Patient will be independent with HEP  -LO     Time Frame (Patient Education Goal, PT)  long term goal (LTG);10 days  -LO       User Key  (r) = Recorded By, (t) = Taken By, (c) = Cosigned By    Initials Name Provider Type    Meghan Dowell, PT Physical Therapist        Clinical Impression     Row Name 04/05/21 1136          Pain Scale: Numbers Pre/Post-Treatment    Pretreatment Pain Rating  0/10 - no pain  -LO     Posttreatment Pain Rating  0/10 - no pain  -LO     Row Name 04/05/21 1136          Plan of Care Review    Plan of Care Reviewed With  patient  -LO     Progress  no change  -LO     Outcome Summary  PT eval complete. Patient alert and oriented x4. Hallpike perfomed first on R with no nystagmus or sx, completed with Epley Maneuver. Hallpike then performed on L, no nystagmus and no sx and completed with Epley maneuver. Sx likely better from maneuver performed this morning by other staff. Transfers and gait still with mild unsteadiness, reduced from baseline function. Patient will benefit from skilled IP PT services to address imapirments for return to PLOF. Recommend home with OP vestibular rehab at [place].  -     Row Name 04/05/21 1136          Therapy Assessment/Plan (PT)    Patient/Family Therapy Goals Statement (PT)  dizziness gone  -LO     Rehab Potential (PT)  good, to achieve stated therapy goals  -LO     Criteria for Skilled Interventions Met (PT)  yes;skilled treatment is necessary  -     Row Name 04/05/21 1136          Vital Signs    Intra Systolic BP Rehab  139  -LO     Intra Treatment Diastolic BP  97  -LO     Post Systolic BP Rehab  132  -LO     Post Treatment Diastolic BP  95  -LO     O2 Delivery Pre  Treatment  room air  -LO     O2 Delivery Intra Treatment  room air  -LO     O2 Delivery Post Treatment  room air  -LO     Pre Patient Position  Supine  -LO     Intra Patient Position  Standing  -LO     Post Patient Position  Supine  -LO     Row Name 04/05/21 1136          Positioning and Restraints    Pre-Treatment Position  in bed  -LO     Post Treatment Position  bed  -LO     In Bed  notified nsg;supine;call light within reach;encouraged to call for assist;with family/caregiver  -       User Key  (r) = Recorded By, (t) = Taken By, (c) = Cosigned By    Initials Name Provider Type    Meghan Dowell, PT Physical Therapist        Outcome Measures     Row Name 04/05/21 1146          How much help from another person do you currently need...    Turning from your back to your side while in flat bed without using bedrails?  4  -LO     Moving from lying on back to sitting on the side of a flat bed without bedrails?  4  -LO     Moving to and from a bed to a chair (including a wheelchair)?  4  -LO     Standing up from a chair using your arms (e.g., wheelchair, bedside chair)?  4  -LO     Climbing 3-5 steps with a railing?  3  -LO     To walk in hospital room?  3  -LO     AM-PAC 6 Clicks Score (PT)  22  -     Row Name 04/05/21 1146          Functional Assessment    Outcome Measure Options  AM-PAC 6 Clicks Basic Mobility (PT)  -       User Key  (r) = Recorded By, (t) = Taken By, (c) = Cosigned By    Initials Name Provider Type    Meghan Dowell, [facility] Physical Therapist        Physical Therapy Education                 Title: PT OT SLP Therapies (Done)     Topic: Physical Therapy (Done)     Point: Mobility training (Done)     Learning Progress Summary           Patient Acceptance, E, VU,NR by KERRY at 4/5/2021 1052    Comment: Patient education regarding purpose  and sequencing of hallpike, epley maneuver. Instructions for maintaining head in neutral postion provided and positions to avoid for next 48 hours provided.    Family Acceptance, E, VU,NR by KERRY at 4/5/2021 1052    Comment: Patient education regarding purpose  and sequencing of hallpike, epley maneuver. Instructions for maintaining head in neutral postion provided and positions to avoid for next 48 hours provided.                   Point: Home exercise program (Done)     Learning Progress Summary           Patient Acceptance, E, VU,NR by LO at 4/5/2021 1052    Comment: Patient education regarding purpose  and sequencing of hallpike, epley maneuver. Instructions for maintaining head in neutral postion provided and positions to avoid for next 48 hours provided.   Family Acceptance, E, VU,NR by KERRY at 4/5/2021 1052    Comment: Patient education regarding purpose  and sequencing of hallpike, epley maneuver. Instructions for maintaining head in neutral postion provided and positions to avoid for next 48 hours provided.                   Point: Body mechanics (Done)     Learning Progress Summary           Patient Acceptance, E, VU,NR by KERRY at 4/5/2021 1052    Comment: Patient education regarding purpose  and sequencing of hallpike, epley maneuver. Instructions for maintaining head in neutral postion provided and positions to avoid for next 48 hours provided.   Family Acceptance, E, VU,NR by KERRY at 4/5/2021 1052    Comment: Patient education regarding purpose  and sequencing of hallpike, epley maneuver. Instructions for maintaining head in neutral postion provided and positions to avoid for next 48 hours provided.                   Point: Precautions (Done)     Learning Progress Summary           Patient Acceptance, E, VU,NR by KERRY at 4/5/2021 1052    Comment: Patient education regarding purpose  and sequencing of hallpike, epley maneuver. Instructions for maintaining head in neutral postion provided and positions to avoid for next 48 hours provided.   Family Acceptance, E, VU,NR by KERRY at 4/5/2021 1052    Comment: Patient education regarding purpose  and sequencing of hallpike,  epley maneuver. Instructions for maintaining head in neutral postion provided and positions to avoid for next 48 hours provided.                               User Key     Initials Effective Dates Name Provider Type Discipline     03/11/20 -  Meghan Grajeda, [facility] Physical Therapist PT              PT Recommendation and Plan  Planned Therapy Interventions (PT): balance training, neuromuscular re-education, patient/family education, vestibular therapy  Plan of Care Reviewed With: patient  Progress: no change  Outcome Summary: PT eval complete. Patient alert and oriented x4. Hallpike perfomed first on R with no nystagmus or sx, completed with Epley Maneuver. Hallpike then performed on L, no nystagmus and no sx and completed with Epley maneuver. Sx likely better from maneuver performed this morning by other staff. Transfers and gait still with mild unsteadiness, reduced from baseline function. Patient will benefit from skilled IP PT services to address imapirments for return to PLOF. Recommend home with OP vestibular rehab at [place].     Time Calculation:   PT Charges     Row Name 04/05/21 1052             Time Calculation    Start Time  1052  -LO      PT Received On  04/05/21  -      PT Goal Re-Cert Due Date  04/15/21  -         Timed Charges    15797 - PT Therapeutic Activity Minutes  18  -LO        User Key  (r) = Recorded By, (t) = Taken By, (c) = Cosigned By    Initials Name Provider Type     Meghan Grajeda, PT Physical Therapist        Therapy Charges for Today     Code Description Service Date Service Provider Modifiers Qty    33162779039 HC PT THERAPEUTIC ACT EA 15 MIN 4/5/2021 Meghan Grajeda, PT GP 1    23218631456 HC PT EVAL LOW COMPLEXITY 3 4/5/2021 Meghan Grajeda, PT GP 1          PT G-Codes  Outcome Measure Options: AM-PAC 6 Clicks Basic Mobility (PT)  AM-PAC 6 Clicks Score (PT): 22  AM-PAC 6 Clicks Score (OT): 16    Meghan Grajeda PT  4/5/2021

## 2021-04-05 NOTE — DISCHARGE SUMMARY
[facility] Medicine Services  DISCHARGE SUMMARY    Patient Name: Tucker Johnson  : 1950  MRN: [id number]    Date of Admission: 2021 11:47 AM  Date of Discharge:  21  Primary Care Physician: Jeet Davis MD    Consults     Date and Time Order Name Status Description    2021  5:10 PM Inpatient Neurology Consult Stroke Completed           Hospital Course     Presenting Problem:   Vertigo [R42]    Active Hospital Problems    Diagnosis  POA   • **BPPV (benign paroxysmal positional vertigo) [H81.10]  Yes   • Thrombocytopenia (CMS/HCC) [D69.6]  Yes   • JERE (obstructive sleep apnea) [G47.33]  Yes   • GERD (gastroesophageal reflux disease) [K21.9]  Yes   • Hyperlipidemia [E78.5]  Yes      Resolved Hospital Problems   No resolved problems to display.          Hospital Course:  Tucker Johnson is a 71 y.o. male with hx of JERE corrected by surgery, allergies, GERD, chronic thrombocytopenia, and tobacco abuse who presents due to dizziness with associated nausea and some left sided numbness. Started while he was mowing the lawn. His neuroimaging was unremarkable including CTA head/neck and MRI brain. Neurology was consulted. Exam was consistent with peripheral vertigo/BPPV. Improved with Epley's maneuver and had improvement in his right sided nystagmus. He has been seen by PT/OT. Will refer for outpatient vestibular therapy. If no improvement, may need referral to ENT. Will prescribe PRN Meclizine. D/C home today in stable condition.       Discharge Follow Up Recommendations for outpatient labs/diagnostics:  F/U with PCP in 1 week  Outpatient vestibular therapy      Day of Discharge     HPI:   Patient seen this morning. Wife at bedside. His dizziness is improved but not gone completely.     Review of Systems  Gen-no fevers, no chills  CV-no chest pain, no palpitations  Resp-no cough, no dyspnea  GI-no N/V/D, no abd pain    All other systems reviewed and negative  except any additional pertinent positives and negatives as discussed in HPI.      Vital Signs:   Temp:  [97.4 °F (36.3 °C)-98.7 °F (37.1 °C)] 97.9 °F (36.6 °C)  Heart Rate:  [66-98] 70  Resp:  [16-22] 16  BP: (132-164)/() 133/89     Physical Exam:  Gen-no acute distress  HENT-NCAT, mucous membranes moist  CV-RRR, S1 S2 normal, no m/r/g  Resp-CTAB, no wheezes or rales  Abd-soft, NT, ND, +BS  Ext-no edema  Neuro-A&Ox3, no focal deficits  Skin-no rashes  Psych-appropriate mood      Pertinent  and/or Most Recent Results     LAB RESULTS:      Lab 04/05/21  0647 04/04/21  1227   WBC 6.22 5.05   HEMOGLOBIN 16.3 15.4   HEMATOCRIT 46.4 41.5   PLATELETS 87* 78*   NEUTROS ABS 3.61 2.57   IMMATURE GRANS (ABS) 0.01 0.05   LYMPHS ABS 1.91 1.78   MONOS ABS 0.62 0.56   EOS ABS 0.06 0.08   MCV 92.8 87.9         Lab 04/05/21  0647 04/04/21  1227   SODIUM 137 138   POTASSIUM 4.8 3.4*   CHLORIDE 103 102   CO2 24.0 20.0*   ANION GAP 10.0 16.0*   BUN 11 15   CREATININE 0.95 1.01   GLUCOSE 105* 166*   CALCIUM 9.5 9.3   MAGNESIUM 3.1* 1.6   PHOSPHORUS 3.7  --    HEMOGLOBIN A1C 5.20  --          Lab 04/05/21  0647 04/04/21  1227   TOTAL PROTEIN  --  6.4   ALBUMIN 4.90 4.50   GLOBULIN  --  1.9   ALT (SGPT)  --  32   AST (SGOT)  --  28   BILIRUBIN  --  2.3*   ALK PHOS  --  48   LIPASE  --  20         Lab 04/04/21  1416 04/04/21  1227   PROBNP  --  164.9   TROPONIN T <0.010 <0.010         Lab 04/05/21  0647   CHOLESTEROL 170   LDL CHOL 57   HDL CHOL 100*   TRIGLYCERIDES 66             Brief Urine Lab Results  (Last result in the past 365 days)      Color   Clarity   Blood   Leuk Est   Nitrite   Protein   CREAT   Urine HCG        04/04/21 1627 Yellow Clear Negative Negative Negative Negative             Microbiology Results (last 10 days)     Procedure Component Value - Date/Time    COVID-19 and FLU A/B PCR - Swab, Nasopharynx [[id number]]  (Normal) Collected: 04/04/21 1551    Lab Status: Final result Specimen: Swab from Nasopharynx  Updated: 04/04/21 1644     COVID19 Not Detected     Influenza A PCR Not Detected     Influenza B PCR Not Detected    Narrative:      Fact sheet for providers: https://www.fda.gov/media/314907/download    Fact sheet for patients: https://www.fda.gov/media/918941/download    Test performed by PCR.          CT Head Without Contrast    Result Date: 4/4/2021  EXAMINATION: CT HEAD WO CONTRAST - 04/04/2021  INDICATION: Vertigo  TECHNIQUE: 5 mm enhanced images to the brain.  The radiation dose reduction device was turned on for each scan per the ALARA (As Low as Reasonably Achievable) protocol.  COMPARISON: None.  FINDINGS: History indicates vertigo, onset while mowing. The calvarium appears intact. Included paranasal sinuses, mastoids and middle ear spaces appear clear. Soft tissue window images show age appropriate generalized cerebral atrophy. There is no evidence of infarct/edema, no evidence of hemorrhage, hydrocephalus, mass or mass effect, or abnormal extra-axial collection. No unusual focal encephalomalacia or significant white matter disease is appreciated.      Age-appropriate generalized cerebral atrophy. No evidence of acute intracranial disease is seen.  DICTATED:   04/04/2021 EDITED/ls :   04/04/2021   This report was finalized on 4/4/2021 10:54 PM by Dr. Rusty Nowak MD.      CT Angiogram Neck    Result Date: 4/4/2021  EXAMINATION: CT ANGIOGRAM NECK - 04/04/2021  INDICATION: Follow up stroke.  TECHNIQUE: Spiral acquisition pre and post contrast 3.0 mm and 0.75 mm axial images through the neck with sagittal and coronal 2D reconstructions and 3D VRT and MIP angiographic reconstructions.  The radiation dose reduction device was turned on for each scan per the ALARA (As Low as Reasonably Achievable) protocol.  COMPARISON: None.  FINDINGS: Proximal subclavian arteries are visible and appear normal in caliber.  On the right, no hemodynamically significant common carotid stenosis is seen. There is fairly dense carotid  bulb plaquing but no evidence of hemodynamically significant stenosis. No ICA or ECA stenosis is seen to the level of skull base.  On the left, no evidence of hemodynamically significant common internal or external carotid artery stenosis is seen. There is relatively milder calcified plaquing of the common carotid bulb.  Vertebral arteries appear co-dominant. Attenuated appearance of the mid right vertebral artery at the level of C4 appears to be due to dense streak artifact from the patient's dental appliances. Left vertebral artery is partly obscured by streak artifact at this same level. Vertebral arteries elsewhere appear normal.  No significant incidental soft tissue neck pathology is appreciated. Included lung apices appear grossly clear.      Mildly limited evaluation of the mid vertebral arteries due to streak artifact from patient's dental appliances. Allowing for this, no evidence of hemodynamically significant carotid or vertebral artery stenosis in the neck. Bilateral carotid bulb calcification incidentally noted.  DICTATED:   04/04/2021 EDITED/ls :   04/04/2021      MRI Brain Without Contrast    Result Date: 4/4/2021  EXAMINATION: MRI BRAIN WO CONTRAST - 04/04/2021  INDICATION: Vertigo  TECHNIQUE: Sagittal and axial T1 and axial T2 FLAIR and diffusion-weighted images of the brain, axial susceptibility-weighted series.  COMPARISON: Cerebral perfusion exam and head CT scan of same date.  FINDINGS: By report, previous studies were negative. Diffusion-weighted images show no evidence of restricted diffusion to suggest acute infarction. 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 No hemorrhage or edema is seen. There is no evidence of significant previous infarct, and there is expected degree of generalized cerebral atrophy for the patient's age. No abnormal extra-axial collection is seen. Sagittal images show the midline  structures to appear grossly normal. There is expected flow signal in the major intracranial arteries and median sagittal sinus. No gross abnormalities are seen in the orbits, paranasal sinuses or mastoids.      No evidence of acute infarct or other acute intracranial disease.  DICTATED:   04/04/2021 EDITED/ls :   04/04/2021   This report was finalized on 4/4/2021 11:21 PM by Dr. Rusty Nowak MD.      XR Chest 1 View    Result Date: 4/4/2021  EXAMINATION: XR CHEST 1 VW - 04/04/2021  INDICATION: Chest pain.  COMPARISON: None.  FINDINGS: Heart, mediastinum and pulmonary vasculature appear within normal limits. Lungs appear well-expanded and grossly clear. Slight coarsening of basilar interstitial markings is probably chronic. No lung consolidation, effusion or pneumothorax is seen.      Mild chronic-appearing changes of the lower lungs. No clearly acute chest disease is identified.  DICTATED:   04/04/2021 EDITED/ls :   04/04/2021    This report was finalized on 4/4/2021 10:38 PM by Dr. Rusty Nowak MD.      CT Angiogram Head    Result Date: 4/4/2021  EXAMINATION: CT ANGIOGRAM HEAD - 04/04/2021  INDICATION: Dizziness.  TECHNIQUE: Pre and postcontrast 3.0 mm and 0.75 mm axial images through the brain with 2D and 3D angiographic reconstructions.  The radiation dose reduction device was turned on for each scan per the ALARA (As Low as Reasonably Achievable) protocol.  COMPARISON: None.  FINDINGS: Distal vertebral arteries, basilar artery, and posterior cerebral arteries appear within normal limits. Intracranial portions of the internal carotid arteries appear normal in caliber with no significant calcification. Anterior and middle cerebral arteries and proximal branches appear within normal limits. No evidence of hemodynamically significant intracranial vascular stenosis.      Negative CTA of the head. No evidence of hemodynamically significant intracranial vascular stenosis.  DICTATED:   04/04/2021 EDITED/ls :   04/04/2021        CT Cerebral Perfusion With & Without Contrast    Result Date: 4/4/2021  EXAMINATION: CT CEREBRAL PERFUSION WWO CONTRAST - 04/04/2021  INDICATION: Neuro deficit.  TECHNIQUE: Cerebral perfusion analysis was performed using computed tomography with IV contrast administration including postprocessing of parametric maps with determination of cerebral blood flow, cerebral blood volume, mean transit time, time to drain and T-max.  The radiation dose reduction device was turned on for each scan per the ALARA (As Low as Reasonably Achievable) protocol.  COMPARISON: Unenhanced head CT scan of same date.  FINDINGS: Rapid analysis is negative with no areas of the brain showing T-max greater than 6 seconds or cerebral blood flow less than 30%. Individual perfusion maps do not appear to show a consistent focal perfusion abnormality to suggest focal ischemia or infarct.      Negative perfusion scan.  DICTATED:   04/04/2021 EDITED/ls :   04/04/2021   This report was finalized on 4/4/2021 10:59 PM by Dr. Rusty Nowak MD.              Discharge Details        Discharge Medications      New Medications      Instructions Start Date   meclizine 25 MG tablet  Commonly known as: ANTIVERT   25 mg, Oral, 3 Times Daily PRN         Continue These Medications      Instructions Start Date   atorvastatin 20 MG tablet  Commonly known as: LIPITOR   20 mg, Oral, Daily, 200001      Diclofenac Sodium 1 % gel gel  Commonly known as: VOLTAREN   4 g, Topical, 4 Times Daily PRN      lansoprazole 30 MG capsule  Commonly known as: PREVACID   30 mg, Oral, Daily      multivitamin with minerals tablet tablet   Oral      OSTEO BI-FLEX ADV DOUBLE ST PO   Oral      ZyrTEC-D Allergy & Congestion 5-120 MG per 12 hr tablet  Generic drug: cetirizine-pseudoephedrine   TAKE 1 TABLET BY MOUTH TWO TIMES A DAY          Stop These Medications    Fluad 0.5 ML suspension prefilled syringe  Generic drug: Influenza Vac A&B Surf Ant Adj     Havrix 1440 EL U/ML  vaccine  Generic drug: hepatitis A     Shingrix 50 MCG/0.5ML reconstituted suspension  Generic drug: Zoster Vac Recomb Adjuvanted            No Known Allergies      Discharge Disposition:  Home or Self Care    Diet:  Hospital:  Diet Order   Procedures   • Diet Regular       Activity:  Activity Instructions     Activity as Tolerated            CODE STATUS:    Code Status and Medical Interventions:   Ordered at: 04/04/21 1639     Level Of Support Discussed With:    Patient     Code Status:    CPR     Medical Interventions (Level of Support Prior to Arrest):    Full       Future Appointments   Date Time Provider Department Center   4/8/2021  8:30 AM Jeet Davis MD [facility]       Additional Instructions for the Follow-ups that You Need to Schedule     Ambulatory Referral to Physical Therapy Vestibular, Evaluate and treat; Full weight bearing   As directed      Specialty needed: Vestibular Evaluate and treat    Weight Bearing Status: Full weight bearing         Discharge Follow-up with PCP   As directed       Currently Documented PCP:    Jeet Davis MD    PCP Phone Number:    [phone]     Follow Up Details: 1 week                     Nellie Vega MD  04/05/21      Time Spent on Discharge:  I spent  35  minutes on this discharge activity which included: face-to-face encounter with the patient, reviewing the data in the system, coordination of the care with the nursing staff as well as consultants, documentation, and entering orders.

## 2021-04-05 NOTE — PROGRESS NOTES
Discharge Planning Assessment  [facility]     Patient Name: Tucker Johnson  MRN: [id number]  Today's Date: 4/5/2021    Admit Date: 4/4/2021    Discharge Needs Assessment     Row Name 04/05/21 1346       Living Environment    Lives With  -- Pt resides in [facility]    Row Name 04/05/21 1335       Living Environment    Lives With  spouse Pt resides in [facility]    Name(s) of Who Lives With Patient  wife- Deborha    Current Living Arrangements  home/apartment/condo    Primary Care Provided by  self    Provides Primary Care For  no one    Family Caregiver if Needed  spouse    Family Caregiver Names  Milvia    Quality of Family Relationships  helpful;involved;supportive    Able to Return to Prior Arrangements  yes       Resource/Environmental Concerns    Resource/Environmental Concerns  none       Transition Planning    Patient/Family Anticipates Transition to  home with family;other (see comments) outpatient rehab services    Patient/Family Anticipated Services at Transition  outpatient care;rehabilitation services    Transportation Anticipated  family or friend will provide       Discharge Needs Assessment    Readmission Within the Last 30 Days  no previous admission in last 30 days    Equipment Currently Used at Home  none    Concerns to be Addressed  discharge planning    Anticipated Changes Related to Illness  none    Equipment Needed After Discharge  none    Discharge Facility/Level of Care Needs  outpatient therapy    Provided Post Acute Provider List?  N/A        Discharge Plan     Row Name 04/05/21 1347       Plan    Plan  home with outpatient therapy services    Provided Post Acute Provider List?  Yes    Post Acute Provider List  Outpatient Therapy    Patient/Family in Agreement with Plan  yes    Plan Comments  CM spoke with pt and wife at bedside. Pt resides in [facility] and is independent of adls. Pt denies use of DME and is not current with home health or outpatient medical services.Pt  dorothy he has [employer] and denies concerns or disruption in coverage. Pt has prescription drug coverage and denies issues obtaining or affording current medications. Pt denies having a living will or POA and declines additional information about them. Pt is not interested in inpatient rehab services and would like to return home. MD has ordered outpatient vestibular therapy and pt is agreeable. CM has checked with [facility] and they have a 2 month wait list. [facility] have [facility] therapist daniel for Vestibular therapy. Pt can be seen next Wednesday 4/14/2021 at 0930. CM went to update pt and spouse on appointment and pt is currently having a procedure done. CM has updated primary RN Minerva of appointment. Pt will have private transportation home.    Final Discharge Disposition Code  01 - home or self-care        Continued Care and Services - Admitted Since 4/4/2021    Coordination has not been started for this encounter.       Expected Discharge Date and Time     Expected Discharge Date Expected Discharge Time    Apr 5, 2021         Demographic Summary     Row Name 04/05/21 1328       General Information    Referral Source  admission list    Reason for Consult  discharge planning    Preferred Language  English     Used During This Interaction  no    General Information Comments  PCP- Jeet Davis       Contact Information    Permission Granted to Share Info With      Contact Information Comments  [phone]        Functional Status     Row Name 04/05/21 1329       Functional Status    Usual Activity Tolerance  good    Current Activity Tolerance  moderate       Functional Status, IADL    Medications  independent    Meal Preparation  independent    Housekeeping  independent    Laundry  independent    Shopping  independent       Mental Status Summary    Recent Changes in Mental Status/Cognitive Functioning  no  changes       Employment/    Employment/ Comments  Pt cofnirms he has [place] [employer] and denies concerns or disruption in coverage. Pt has prescription drug coverage and denies issues obtaining or affording current medications.        Psychosocial    No documentation.       Abuse/Neglect    No documentation.       Legal    No documentation.       Substance Abuse    No documentation.       Patient Forms    No documentation.           Dee Garcia RN

## 2021-04-05 NOTE — PLAN OF CARE
Goal Outcome Evaluation:  Plan of Care Reviewed With: patient  Progress: no change  VSS; potassium and magnesium were replaced-pt was unable to tolerate po potassium; he stands bedside to use the urinal with stand-by assist.

## 2021-04-05 NOTE — ED PROVIDER NOTES
"Subjective   Pt presents with dizziness.  He was mowing the lawn and had abrupt onset of vertigo, nausea, dyspnea, headache, bilateral hand tingling and jaw discomfort.  No chest pain or palpitations.  Prior to onset of symptoms he had felt completely at baseline and denies recent illness.  He stopped to rest but got no relief.  He had to crawl back into his house.    He has a history of vertigo in the remote past that was less severe than this.  He udnerwent eval that sounds like tilt-table or [place]-Hallpike or similar and says he was told \"rocks in the inner ear\" was the problem but should only bother him supine.      History provided by:  Patient      Review of Systems   Constitutional: Negative for fever.   Respiratory: Positive for shortness of breath.    Cardiovascular: Negative for chest pain and palpitations.   Gastrointestinal: Positive for nausea.   Neurological: Positive for dizziness and headaches.   All other systems reviewed and are negative.      Past Medical History:   Diagnosis Date   • Allergic    • Arthritis    • GERD (gastroesophageal reflux disease)    • Hyperlipidemia    • JERE (obstructive sleep apnea)     treated with surgery   • Tobacco abuse        No Known Allergies    Past Surgical History:   Procedure Laterality Date   • ELBOW ARTHROPLASTY     • SHOULDER SURGERY     • UVULOPALATOPHARYNGOPLASTY         Family History   Problem Relation Age of Onset   • Stroke Mother    • Heart disease Father        Social History     Socioeconomic History   • Marital status:      Spouse name: Not on file   • Number of children: Not on file   • Years of education: Not on file   • Highest education level: Not on file   Tobacco Use   • Smoking status: Former Smoker     Years: 30.00     Types: Cigars, Cigarettes   • Smokeless tobacco: Never Used   Substance and Sexual Activity   • Alcohol use: Yes     Alcohol/week: 3.0 standard drinks     Types: 3 Shots of liquor per week   • Drug use: No   • Sexual " activity: Defer           Objective   Physical Exam  Vitals and nursing note reviewed.   Constitutional:       General: He is not in acute distress.     Appearance: Normal appearance. He is not ill-appearing.   HENT:      Head: Normocephalic and atraumatic.      Mouth/Throat:      Mouth: Mucous membranes are moist.   Eyes:      General: No scleral icterus.        Right eye: No discharge.         Left eye: No discharge.      Extraocular Movements: Extraocular movements intact.      Conjunctiva/sclera: Conjunctivae normal.      Pupils: Pupils are equal, round, and reactive to light.      Comments: There is horizontal nystagmus at baseline and with left-right eye movement.  A torsional component becomes apparent with upward gaze.   Cardiovascular:      Rate and Rhythm: Normal rate and regular rhythm.      Heart sounds: No murmur heard.     Pulmonary:      Effort: Pulmonary effort is normal. No respiratory distress.      Breath sounds: Normal breath sounds. No wheezing.   Abdominal:      General: Bowel sounds are normal. There is no distension.      Palpations: Abdomen is soft.      Tenderness: There is no abdominal tenderness. There is no guarding or rebound.   Musculoskeletal:         General: No swelling. Normal range of motion.      Cervical back: Normal range of motion and neck supple.   Skin:     General: Skin is warm and dry.      Findings: No rash.   Neurological:      General: No focal deficit present.      Mental Status: He is alert and oriented to person, place, and time. Mental status is at baseline.      Comments: Speech fluent and articulate.  No facial droop.  5/5 strength in arms and legs.  Normal .  Mentation normal.    Nystagmus as documented under eyes.   Psychiatric:         Mood and Affect: Mood normal.         Behavior: Behavior normal.         Thought Content: Thought content normal.         Procedures           ED Course         Meds with no relief.  CT negative.  EKG SR with PVCs.  Labs  benign.  Repeat meds, still no relief.  Neurology consulted and MRI ordered to eval possible posterior CVA.    Patient stable on serial rechecks.  Discussed exam findings, test results so far and concerns in detail at the bedside.  Discussed need for admission for further evaluation and treatment.                                    MDM  Number of Diagnoses or Management Options     Amount and/or Complexity of Data Reviewed  Clinical lab tests: ordered and reviewed  Tests in the radiology section of CPT®: ordered and reviewed  Discuss the patient with other providers: yes  Independent visualization of images, tracings, or specimens: yes        Final diagnoses:   Vertigo       ED Disposition  ED Disposition     ED Disposition Condition Comment    Decision to Admit  Level of Care: Telemetry [5]   Diagnosis: Vertigo [125332]            No follow-up provider specified.       Medication List      No changes were made to your prescriptions during this visit.          Francisco Javier Rainey MD  04/04/21 2038

## 2021-04-05 NOTE — OUTREACH NOTE
Prep Survey      Responses   [facility] discharged [facility]?  [place]   Is LACE score < 7 ?  Yes   Emergency Room discharge w/ pulse ox?  No   Eligibility  [facility]   Date of Admission  04/04/21   Date of Discharge  04/05/21   Discharge Disposition  Home or Self Care   Discharge diagnosis  BPPV (benign paroxysmal positional vertigo)   Does the patient have one of the following disease processes/diagnoses(primary or secondary)?  Other   Does the patient have Home health ordered?  No   Is there a DME ordered?  No   Prep survey completed?  Yes          Tiny Smith RN

## 2021-04-05 NOTE — PROGRESS NOTES
Stroke Progress Note       Chief Complaint: Dizziness    Subjective    Subjective     Subjective:  No acute issues overnight.  Patient continues to have mild dizziness especially when moving.  Denies any focal weakness.    Review of Systems   Constitutional: Negative.    HENT: Negative.    Eyes: Negative.    Respiratory: Negative.    Cardiovascular: Negative.    Gastrointestinal: Positive for nausea and vomiting.   Endocrine: Negative.    Genitourinary: Negative.    Musculoskeletal: Negative.    Skin: Negative.    Allergic/Immunologic: Negative.    Neurological: Positive for dizziness.        Imbalance walking   Psychiatric/Behavioral: Negative.             Objective    Objective      Temp:  [97.4 °F (36.3 °C)-98.7 °F (37.1 °C)] 97.9 °F (36.6 °C)  Heart Rate:  [] 70  Resp:  [16-22] 16  BP: (132-164)/() 133/89        Neurological Exam  Mental Status  Awake, alert and oriented to person, place and time.Alert. Speech is normal. Language is fluent with no aphasia. Attention and concentration are normal. Fund of knowledge is appropriate for level of education.    Cranial Nerves  CN II: Visual fields full to confrontation.  CN III, IV, VI: Extraocular movements intact bilaterally. Nystagmus present: Right beating nystagmus, on looking to the right and in vertical gaze.  No nystagmus on looking to the left. Normal saccades. Normal smooth pursuit. Normal lids and orbits bilaterally. Pupils equal round and reactive to light bilaterally. Patient is right beating nystagmus improved after performing Epley's maneuver.  CN V: Facial sensation is normal.  CN VII: Full and symmetric facial movement.  CN VIII: Equal hearing bilaterally.  CN IX, X: Palate elevates symmetrically  CN XI: Shoulder shrug strength is normal.  CN XII: Tongue midline without atrophy or fasciculations.    Motor  Normal muscle bulk throughout. No fasciculations present. Normal muscle tone. Strength is 5/5 throughout all four  extremities.    Sensory  Sensation is intact to light touch, pinprick, vibration and proprioception in all four extremities.    Reflexes  Deep tendon reflexes are 2+ and symmetric in all four extremities with downgoing toes bilaterally.    Coordination  No dysmetria.    Gait  Slight imbalance walking, broad-based gait.      Physical Exam  Vitals and nursing note reviewed.   Constitutional:       Appearance: Normal appearance.   HENT:      Head: Normocephalic and atraumatic.      Mouth/Throat:      Mouth: Mucous membranes are moist.      Pharynx: Oropharynx is clear.   Eyes:      General: Lids are normal.      Extraocular Movements: EOM normal. Nystagmus present.      Conjunctiva/sclera: Conjunctivae normal.      Pupils: Pupils are equal, round, and reactive to light.   Cardiovascular:      Rate and Rhythm: Normal rate and regular rhythm.   Pulmonary:      Effort: Pulmonary effort is normal. No respiratory distress.   Musculoskeletal:      Cervical back: Normal range of motion and neck supple.   Neurological:      Mental Status: He is alert.      Deep Tendon Reflexes: Strength normal and reflexes are normal and symmetric.   Psychiatric:         Mood and Affect: Mood normal.         Speech: Speech normal.         Behavior: Behavior normal.         Results Review:    I reviewed the patient's new clinical results.  MRI brain shows no acute changes, no hemorrhage, mild white matter disease.  CT angiogram of head and neck shows no significant stenosis or occlusion, mild bilateral ICA atherosclerosis causing less than 50% stenosis.  CT perfusion is negative for any positive findings  His A1c is 5.2, LDL is 57, BUN/creatinine is 15/1.01              Assessment/Plan     Assessment/Plan:  71-year-old white male with almost daily alcohol use, exheavy tobacco user, peripheral vertigo, sleep apnea, who presented with dizziness associated with nausea/vomiting x1, and some left-sided numbness.  His imaging shows no acute stroke  findings, and bedside neurological exam is consistent with benign positional vertigo.      1. Benign positional peripheral vertigo.  Performed Apley's maneuver, which improved with right-sided beating nystagmus.  Patient is MRI brain and CT angiogram of head and neck shows no significant stenosis/occlusion.  Recommend vestibular therapy with physical therapy.  May benefit with Epley's maneuver.  If symptoms do not improve, recommend ENT follow-up.  2. Alcohol use.  Patient uses 2-3 drinks daily.  Decrease intake if possible.  3. PT/OT can work with him.  4. Healthy heart diet.    Case was discussed with patient, his wife, nursing and the hospitalist.  No further stroke work-up at this time.  Thank you for the consult.          Danyel Malhotra MD  04/05/21  12:48 EDT

## 2021-04-05 NOTE — PLAN OF CARE
Goal Outcome Evaluation:  Plan of Care Reviewed With: patient  Progress: no change  Outcome Summary: PT eval complete. Patient alert and oriented x4. Hallpike perfomed first on R with no nystagmus or sx, completed with Epley Maneuver. Hallpike then performed on L, no nystagmus and no sx and completed with Epley maneuver. Sx likely better from maneuver performed this morning by other staff. Transfers and gait still with mild unsteadiness, reduced from baseline function. Patient will benefit from skilled IP PT services to address imapirments for return to PLOF. Recommend home with OP vestibular rehab at [place].

## 2021-04-05 NOTE — THERAPY EVALUATION
Patient Name: Tucker Johnson  : 1950    MRN: [id number]                              Today's Date: 2021       Admit Date: 2021    Visit Dx:     ICD-10-CM ICD-9-CM   1. Vertigo  R42 780.4   2. Impaired mobility and ADLs  Z74.09 V49.89    Z78.9      Patient Active Problem List   Diagnosis   • Vertigo   • Thrombocytopenia (CMS/HCC)   • JERE (obstructive sleep apnea)   • GERD (gastroesophageal reflux disease)   • Hyperlipidemia     Past Medical History:   Diagnosis Date   • Allergic    • Arthritis    • GERD (gastroesophageal reflux disease)    • Hyperlipidemia    • JERE (obstructive sleep apnea)     treated with surgery   • Tobacco abuse      Past Surgical History:   Procedure Laterality Date   • ELBOW ARTHROPLASTY     • SHOULDER SURGERY     • UVULOPALATOPHARYNGOPLASTY       General Information     Row Name 21 0845          OT Time and Intention    Document Type  evaluation  -SD     Mode of Treatment  occupational therapy  -SD     Row Name 21 0845          General Information    Patient Profile Reviewed  yes  -SD     Prior Level of Function  independent:;all household mobility;community mobility;ADL's;work;yard work;driving  -SD     Existing Precautions/Restrictions  fall  -SD     Barriers to Rehab  none identified  -SD     Row Name 21 0845          Living Environment    Lives With  spouse  -SD     Row Name 21 0845          Home Main Entrance    Number of Stairs, Main Entrance  none  -SD     Stair Railings, Main Entrance  none  -SD     Row Name 21 0845          Stairs Within Home, Primary    Stairs, Within Home, Primary  wife has complex regional pain syndrome in knee & home is all one level to accomodate her  -SD     Number of Stairs, Within Home, Primary  none  -SD     Stair Railings, Within Home, Primary  none  -SD     Row Name 21 0845          Cognition    Orientation Status (Cognition)  oriented x 4  -SD     Row Name 21 0845          Safety Issues, Functional  Mobility    Safety Issues Affecting Function (Mobility)  safety precaution awareness;safety precautions follow-through/compliance  -SD     Impairments Affecting Function (Mobility)  balance;endurance/activity tolerance;strength  -SD     Comment, Safety Issues/Impairments (Mobility)  dizziness @3/10 in bed & increased to a 7/10 during ambulation to door  -SD       User Key  (r) = Recorded By, (t) = Taken By, (c) = Cosigned By    Initials Name Provider Type    SD Zara Lozada, OT Occupational Therapist          Mobility/ADL's     Row Name 04/05/21 0916          Bed Mobility    Bed Mobility  rolling right;rolling left;scooting/bridging;sidelying-sit-sidelying  -SD     Rolling Left [place] (Bed Mobility)  modified independence  -SD     Rolling Right [place] (Bed Mobility)  modified independence  -SD     Scooting/Bridging [place] (Bed Mobility)  modified independence  -SD     Sidelying-Sit [place] (Bed Mobility)  modified independence;verbal cues  -SD     Sidelying-Sit-Sidelying [place] (Bed Mobility)  modified independence;verbal cues  -SD     Assistive Device (Bed Mobility)  bed rails  -SD     Row Name 04/05/21 0916          Transfers    Transfers  sit-stand transfer  -SD     Sit-Stand [place] (Transfers)  contact guard;verbal cues  -SD     Row Name 04/05/21 0916          Sit-Stand Transfer    Assistive Device (Sit-Stand Transfers)  walker, front-wheeled  -SD     Row Name 04/05/21 0916          Functional Mobility    Functional Mobility- Ind. Level  contact guard assist;verbal cues required  -SD     Functional Mobility- Device  rolling walker  -SD     Functional Mobility-Distance (Feet)  12  -SD     Functional Mobility- Safety Issues  step length decreased;balance decreased during turns  -SD     Functional Mobility- Comment  pt. reported his dizziness increasing to a 7/10 during ambulation  -SD     Row Name 04/05/21 0916          Activities of Daily Living    BADL  Assessment/Intervention  grooming;lower body dressing  -SD     Row Name 04/05/21 0916          Grooming Assessment/Training    [address] Level (Grooming)  set up;wash face, hands  -SD     Position (Grooming)  sitting up in bed  -SD     Row Name 04/05/21 0916          Lower Body Dressing Assessment/Training    [address] Level (Lower Body Dressing)  don;socks;maximum assist (25% patient effort)  -SD     Position (Lower Body Dressing)  edge of bed sitting  -SD       User Key  (r) = Recorded By, (t) = Taken By, (c) = Cosigned By    Initials Name Provider Type    SD Zara Lozada OT Occupational Therapist        Obj/Interventions     Row Name 04/05/21 0924          Sensory Assessment (Somatosensory)    Sensory Assessment (Somatosensory)  other (see comments) pt. reporting tingling in B hands during trasition back to bed  -SD     Sensory Subjective Reports  tingling  -SD     Row Name 04/05/21 0924          Vision Assessment/Intervention    Visual Impairment/Limitations  corrective lenses full-time;WFL;other (see comments) pt. able to focus on things looking straight ahead, but unable to look L or R without dizziness increasing  -SD     Row Name 04/05/21 0924          Range of Motion Comprehensive    General Range of Motion  bilateral upper extremity ROM WNL  -SD     [facility] Name 04/05/21 0924          Strength Comprehensive (MMT)    Comment, General Manual Muscle Testing (MMT) Assessment  B UE Strength: 5/5 grossly  -SD     Row Name 04/05/21 0924          Balance    Balance Assessment  sitting static balance;sitting dynamic balance;sit to stand dynamic balance;standing static balance;standing dynamic balance  -SD     Static Sitting Balance  WFL;sitting, edge of bed  -SD     Dynamic Sitting Balance  sitting, edge of bed;WFL  -SD     Sit to Stand Dynamic Balance  mild impairment;supported;standing  -SD     Static Standing Balance  mild impairment;supported;standing  -SD     Dynamic Standing Balance  mild  impairment;supported;standing  -SD       User Key  (r) = Recorded By, (t) = Taken By, (c) = Cosigned By    Initials Name Provider Type    Zara Mckeon OT Occupational Therapist        Goals/Plan     Row Name 04/05/21 0932          Bed Mobility Goal 1 (OT)    Activity/Assistive Device (Bed Mobility Goal 1, OT)  bed mobility activities, all  -SD     [place] Level/Cues Needed (Bed Mobility Goal 1, OT)  independent  -SD     Time Frame (Bed Mobility Goal 1, OT)  by discharge  -SD     Progress/Outcomes (Bed Mobility Goal 1, OT)  goal ongoing  -SD     Row Name 04/05/21 0932          Transfer Goal 1 (OT)    Activity/Assistive Device (Transfer Goal 1, OT)  sit-to-stand/stand-to-sit;bed-to-chair/chair-to-bed;toilet;walker, rolling  -SD     [place] Level/Cues Needed (Transfer Goal 1, OT)  supervision required  -SD     Time Frame (Transfer Goal 1, OT)  by discharge  -SD     Progress/Outcome (Transfer Goal 1, OT)  goal ongoing  -SD     Row Name 04/05/21 0932          Dressing Goal 1 (OT)    Activity/Device (Dressing Goal 1, OT)  dressing skills, all  -SD     [place]/Cues Needed (Dressing Goal 1, OT)  supervision required  -SD     Time Frame (Dressing Goal 1, OT)  by discharge  -SD     Progress/Outcome (Dressing Goal 1, OT)  goal ongoing  -SD     Row Name 04/05/21 0932          Grooming Goal 1 (OT)    Activity/Device (Grooming Goal 1, OT)  grooming skills, all  -SD     [place] (Grooming Goal 1, OT)  modified independence  -SD     Time Frame (Grooming Goal 1, OT)  by discharge  -SD     Strategies/Barriers (Grooming Goal 1, OT)  @sink side  -SD     Progress/Outcome (Grooming Goal 1, OT)  goal ongoing  -SD     Row Name 04/05/21 0932          Therapy Assessment/Plan (OT)    Planned Therapy Interventions (OT)  activity tolerance training;IADL retraining;functional balance retraining;occupation/activity based interventions;patient/caregiver education/training;transfer/mobility retraining  -SD        User Key  (r) = Recorded By, (t) = Taken By, (c) = Cosigned By    Initials Name Provider Type    Zara Mckeon, OT Occupational Therapist        Clinical Impression     Row Name 04/05/21 0845          Pain Assessment    Additional Documentation  Pain Scale: Numbers Pre/Post-Treatment (Group)  -SD     Row Name 04/05/21 0845          Pain Scale: Numbers Pre/Post-Treatment    Pretreatment Pain Rating  0/10 - no pain  -SD     Posttreatment Pain Rating  0/10 - no pain  -SD     Row Name 04/05/21 0845          Plan of Care Review    Plan of Care Reviewed With  patient;spouse  -SD     Progress  no change  -SD     Outcome Summary  OT IE completed. Pt. supine in bed with towel roll at neck. Pt. reporting 1/10 dizziness in supine. Pt. log rolled onto side with v/c's & dizziness increased to 3/10. Sidelying to sit with v/c's. Bed mobility: SUP, LBD: max A, grooming: set up, STS: CGA using RW, ambulated to door & back to EOB: CGA using RW, sit to sidelying: SUP & v/c's. Pt. is appropriate for OT skilled services to address decreased ADL & functional mobility indep. Recommend home with assist & OP Vestibular therapy upon d/c.  -SD     Row Name 04/05/21 0820          Therapy Assessment/Plan (OT)    Patient/Family Therapy Goal Statement (OT)  return to PLOF  -SD     Rehab Potential (OT)  good, to achieve stated therapy goals  -SD     Criteria for Skilled Therapeutic Interventions Met (OT)  yes;meets criteria;skilled treatment is necessary  -SD     Therapy Frequency (OT)  daily  -SD     Row Name 04/05/21 0845          Therapy Plan Review/Discharge Plan (OT)    Equipment Needs Upon Discharge (OT)  walker, rolling assess further as pt. progresses  -SD     Anticipated Discharge Disposition (OT)  home with assist;home with outpatient therapy services  -SD     Row Name 04/05/21 0807          Vital Signs    O2 Delivery Pre Treatment  room air  -SD     O2 Delivery Intra Treatment  room air  -SD     O2 Delivery Post Treatment   room air  -SD     Pre Patient Position  Supine  -SD     Intra Patient Position  Standing  -SD     Post Patient Position  Supine  -SD     Row Name 04/05/21 0845          Positioning and Restraints    Pre-Treatment Position  in bed  -SD     Post Treatment Position  bed  -SD     In Bed  notified nsg;fowlers;call light within reach;encouraged to call for assist;with family/caregiver  -SD       User Key  (r) = Recorded By, (t) = Taken By, (c) = Cosigned By    Initials Name Provider Type    Zara Mckeon OT Occupational Therapist        Outcome Measures     Row Name 04/05/21 0845          How much help from another is currently needed...    Putting on and taking off regular lower body clothing?  2  -SD     Bathing (including washing, rinsing, and drying)  2  -SD     Toileting (which includes using toilet bed pan or urinal)  3  -SD     Putting on and taking off regular upper body clothing  3  -SD     Taking care of personal grooming (such as brushing teeth)  3  -SD     Eating meals  3  -SD     AM-PAC 6 Clicks Score (OT)  16  -SD     Row Name 04/05/21 0845          Functional Assessment    Outcome Measure Options  AM-PAC 6 Clicks Daily Activity (OT)  -SD       User Key  (r) = Recorded By, (t) = Taken By, (c) = Cosigned By    Initials Name Provider Type    Zara Mckeon OT Occupational Therapist        Occupational Therapy Education                 Title: PT OT SLP Therapies (Done)     Topic: Occupational Therapy (Done)     Point: ADL training (Done)     Description:   Instruct learner(s) on proper safety adaptation and remediation techniques during self care or transfers.   Instruct in proper use of assistive devices.              Learning Progress Summary           Patient Acceptance, E, VU,NR by SD at 4/5/2021 0934    Comment: Pt. & wife educated on role of OT, and both are agreeable with OT POC.   Significant Other Acceptance, E, VU,NR by SD at 4/5/2021 0934    Comment: Pt. & wife educated on  role of OT, and both are agreeable with OT POC.                   Point: Home exercise program (Done)     Description:   Instruct learner(s) on appropriate technique for monitoring, assisting and/or progressing therapeutic exercises/activities.              Learning Progress Summary           Patient Acceptance, E, VU,NR by SD at 4/5/2021 0934    Comment: Pt. & wife educated on role of OT, and both are agreeable with OT POC.   Significant Other Acceptance, E, VU,NR by SD at 4/5/2021 0934    Comment: Pt. & wife educated on role of OT, and both are agreeable with OT POC.                   Point: Precautions (Done)     Description:   Instruct learner(s) on prescribed precautions during self-care and functional transfers.              Learning Progress Summary           Patient Acceptance, E, VU,NR by SD at 4/5/2021 0934    Comment: Pt. & wife educated on role of OT, and both are agreeable with OT POC.   Significant Other Acceptance, E, VU,NR by SD at 4/5/2021 0934    Comment: Pt. & wife educated on role of OT, and both are agreeable with OT POC.                   Point: Body mechanics (Done)     Description:   Instruct learner(s) on proper positioning and spine alignment during self-care, functional mobility activities and/or exercises.              Learning Progress Summary           Patient Acceptance, E, VU,NR by SD at 4/5/2021 0934    Comment: Pt. & wife educated on role of OT, and both are agreeable with OT POC.   Significant Other Acceptance, E, VU,NR by SD at 4/5/2021 0934    Comment: Pt. & wife educated on role of OT, and both are agreeable with OT POC.                               User Key     Initials Effective Dates Name Provider Type Discipline    SD 06/08/18 -  Zara Lozada OT Occupational Therapist OT              OT Recommendation and Plan  Planned Therapy Interventions (OT): activity tolerance training, IADL retraining, functional balance retraining, occupation/activity based interventions,  patient/caregiver education/training, transfer/mobility retraining  Therapy Frequency (OT): daily  Plan of Care Review  Plan of Care Reviewed With: patient, spouse  Progress: no change  Outcome Summary: OT IE completed. Pt. supine in bed with towel roll at neck. Pt. reporting 1/10 dizziness in supine. Pt. log rolled onto side with v/c's & dizziness increased to 3/10. Sidelying to sit with v/c's. Bed mobility: SUP, LBD: max A, grooming: set up, STS: CGA using RW, ambulated to door & back to EOB: CGA using RW, sit to sidelying: SUP & v/c's. Pt. is appropriate for OT skilled services to address decreased ADL & functional mobility indep. Recommend home with assist & OP Vestibular therapy upon d/c.     Time Calculation:   Time Calculation- OT     Row Name 04/05/21 0935             Time Calculation- OT    OT Received On  04/05/21  -SD      OT Goal Re-Cert Due Date  04/15/21  -SD        User Key  (r) = Recorded By, (t) = Taken By, (c) = Cosigned By    Initials Name Provider Type    Zara Mckeon OT Occupational Therapist        Therapy Charges for Today     Code Description Service Date Service Provider Modifiers Qty    [id number] HC OT EVAL LOW COMPLEXITY 4 4/5/2021 Zara Lozada OT GO 1               Zara Lozada OT  4/5/2021

## 2021-04-06 ENCOUNTER — TRANSITIONAL CARE MANAGEMENT TELEPHONE ENCOUNTER (OUTPATIENT)
Dept: CALL CENTER | Facility: HOSPITAL | Age: 71
End: 2021-04-06

## 2021-04-06 ENCOUNTER — TELEPHONE (OUTPATIENT)
Dept: FAMILY MEDICINE CLINIC | Facility: CLINIC | Age: 71
End: 2021-04-06

## 2021-04-06 NOTE — TELEPHONE ENCOUNTER
Caller: Milvia Johnson    Relationship: Emergency Contact    Best call back number: [phone]    What is the medical concern/diagnosis: VERTIGO    What specialty or service is being requested: VESTIBULAR EVALUATUION    What is the office location: [facility]    What is the office phone number:     Any additional details: WIFE STATES THAT PATIENT HAS AN APPOINTMENT SCHEDULED ON 4/14. BUT WANTS AN APPOINTMENT ASAP THIS WEEK. THOSE TWO LOCATIONS ABOVE CAN DO THE EVALUATION ASAP

## 2021-04-06 NOTE — OUTREACH NOTE
Call Center TCM Note      Responses   [facility] patient discharged from?  [place]   Does the patient have one of the following disease processes/diagnoses(primary or secondary)?  Other   TCM attempt successful?  No   Unsuccessful attempts  Attempt 2          Candi Zelaya MA    4/6/2021, 16:37 EDT

## 2021-04-06 NOTE — OUTREACH NOTE
Call Center TCM Note      Responses   [facility] patient discharged from?  [place]   Does the patient have one of the following disease processes/diagnoses(primary or secondary)?  Other   TCM attempt successful?  No   Unsuccessful attempts  Attempt 1   Call Status  Left message          Candi Zelaya MA    4/6/2021, 14:42 EDT

## 2021-04-07 ENCOUNTER — TRANSITIONAL CARE MANAGEMENT TELEPHONE ENCOUNTER (OUTPATIENT)
Dept: CALL CENTER | Facility: HOSPITAL | Age: 71
End: 2021-04-07

## 2021-04-07 NOTE — OUTREACH NOTE
Call Center TCM Note      Responses   [facility] patient discharged from?  [place]   Does the patient have one of the following disease processes/diagnoses(primary or secondary)?  Other   TCM attempt successful?  No   Unsuccessful attempts  Attempt 3 [patient and spouse]          Martina Carvalho RN    4/7/2021, 09:15 EDT

## 2021-04-13 RX ORDER — MECLIZINE HYDROCHLORIDE 25 MG/1
25 TABLET ORAL 3 TIMES DAILY PRN
Qty: 15 TABLET | Refills: 0 | Status: SHIPPED | OUTPATIENT
Start: 2021-04-13 | End: 2022-12-13

## 2021-04-13 NOTE — TELEPHONE ENCOUNTER
Last Office Visit: 03/09/2021  Next Office Visit: 04/16/2021    Labs completed in past 6 months? yes  Labs completed in past year? yes    Last Refill Date: 04/05/2021  Quantity: 15  Refills: 0     Pharmacy: [id number] - [place], [place] - [address] [place] [address] - [phone]  - [phone] FX   [address] [address]   Phone:  [phone]  Fax:  [phone]

## 2021-04-16 ENCOUNTER — OFFICE VISIT (OUTPATIENT)
Dept: FAMILY MEDICINE CLINIC | Facility: CLINIC | Age: 71
End: 2021-04-16

## 2021-04-16 VITALS
WEIGHT: 176 LBS | HEART RATE: 84 BPM | DIASTOLIC BLOOD PRESSURE: 84 MMHG | OXYGEN SATURATION: 99 % | RESPIRATION RATE: 15 BRPM | HEIGHT: 72 IN | TEMPERATURE: 96.9 F | BODY MASS INDEX: 23.84 KG/M2 | SYSTOLIC BLOOD PRESSURE: 122 MMHG

## 2021-04-16 DIAGNOSIS — H81.10 BENIGN PAROXYSMAL POSITIONAL VERTIGO, UNSPECIFIED LATERALITY: Primary | ICD-10-CM

## 2021-04-16 PROCEDURE — 99213 OFFICE O/P EST LOW 20 MIN: CPT | Performed by: FAMILY MEDICINE

## 2021-04-16 NOTE — PROGRESS NOTES
"Subjective   Tucker Johnson is a 71 y.o. male    Chief Complaint    Hospital follow-up   Vertigo    History of Present Illness  The patient presents accompanied by a female complaining of waking Easter Sunday with plans to mow the yard, but only got half of it done. He describes having to crawl on all fours into the house because he could not walk. Mentally he was fine. The patient was checking off a mental list of red flags for stroke or heart attack. He had tingling, numbness, headache, neck ache, sweating profusely after he got back into the garage. It started while he was mowing. He walked to his garage and sat on a bench and drank some water, but then decided he should sit on the floor. He did that and drank more water and then began to sweat profusely and shaking. The patient has a history of positional vertigo a couple of years ago, but not this severe.  The patient said he cannot fully  if the head maneuvers they put him through actually helped, but thought they did some good. He said he thinks he is 85 percent back to his normal. The patient described a floating feeling while being driven to this appointment. He also notes he could not walk a straight line down a hallway. The patient said he has noted various times over the last year while in bed and rolling over getting that \"wonky\" feeling but ignoring it. He decided a couple of years ago to not ride his motorcycle anymore because of the vertigo.     Heart attack and stroke were both ruled out during his hospital visit. He was extensively tested and scanned and no diagnosis was made other than vertigo.     The patient has an appointment on Tuesday 04/20/2021 with [facility] in [place].     The following portions of the patient's history were reviewed and updated as appropriate: allergies, current medications, past social history and problem list    Review of Systems   Constitutional: Negative for fatigue and unexpected weight change.   Respiratory: " Negative for cough, chest tightness and shortness of breath.    Cardiovascular: Negative for chest pain, palpitations and leg swelling.   Gastrointestinal: Negative for nausea.   Skin: Negative for color change and rash.   Neurological: Negative for dizziness, syncope, weakness and headaches.       Objective     Vitals:    04/16/21 1502   BP: 122/84   Pulse: 84   Resp: 15   Temp: 96.9 °F (36.1 °C)   SpO2: 99%       Physical Exam  Vitals and nursing note reviewed.   Constitutional:       Appearance: He is well-developed.   Neck:      Vascular: No JVD.   Cardiovascular:      Rate and Rhythm: Normal rate and regular rhythm.      Pulses: Normal pulses.      Heart sounds: Normal heart sounds. No murmur heard.     Pulmonary:      Effort: Pulmonary effort is normal. No respiratory distress.      Breath sounds: Normal breath sounds.   Abdominal:      General: Bowel sounds are normal.      Palpations: Abdomen is soft.      Tenderness: There is no abdominal tenderness.   Skin:     General: Skin is warm and dry.         Assessment/Plan   Problems Addressed this Visit        ENT    BPPV (benign paroxysmal positional vertigo) - Primary      Diagnoses       Codes Comments    Benign paroxysmal positional vertigo, unspecified laterality    -  Primary ICD-10-CM: H81.10  ICD-9-CM: 386.11       Continue vestibular rehabilitation.  Continue meclizine as needed.         Scribed for SUSANNE Davis MD by Soraida Alvarez.  04/16/21   16:36 EDT    I have personally performed the services described in this document as scribed by the above individual, and it is both accurate and complete.  SUSANNE Davis MD  4/18/2021  11:58 EDT

## 2021-04-22 DIAGNOSIS — K21.9 GASTROESOPHAGEAL REFLUX DISEASE, UNSPECIFIED WHETHER ESOPHAGITIS PRESENT: ICD-10-CM

## 2021-04-22 RX ORDER — LANSOPRAZOLE 30 MG/1
CAPSULE, DELAYED RELEASE ORAL
Qty: 90 CAPSULE | Refills: 3 | Status: SHIPPED | OUTPATIENT
Start: 2021-04-22 | End: 2022-04-18

## 2021-06-25 NOTE — TELEPHONE ENCOUNTER
Caller: Tucker Johnson    Relationship: Self    Best call back number: [phone]  Medication needed:   Requested Prescriptions     Pending Prescriptions Disp Refills   • meclizine (ANTIVERT) 25 MG tablet 15 tablet 0     Sig: Take 1 tablet by mouth 3 (Three) Times a Day As Needed for Dizziness.       When do you need the refill by: ASAP    What additional details did the patient provide when requesting the medication:     Does the patient have less than a 3 day supply:  [x] Yes  [] No    What is the patient's preferred pharmacy:      [id number] - [facility] [address] [facility] [address] - [phone]  - [phone] FX                 Alcohol-induced acute pancreatitis, unspecified complication status

## 2021-12-22 DIAGNOSIS — E78.2 MIXED HYPERLIPIDEMIA: ICD-10-CM

## 2021-12-22 RX ORDER — ATORVASTATIN CALCIUM 20 MG/1
TABLET, FILM COATED ORAL
Qty: 30 TABLET | Refills: 0 | Status: SHIPPED | OUTPATIENT
Start: 2021-12-22 | End: 2022-01-20

## 2022-01-13 ENCOUNTER — LAB (OUTPATIENT)
Dept: LAB | Facility: HOSPITAL | Age: 72
End: 2022-01-13

## 2022-01-13 ENCOUNTER — OFFICE VISIT (OUTPATIENT)
Dept: FAMILY MEDICINE CLINIC | Facility: CLINIC | Age: 72
End: 2022-01-13

## 2022-01-13 VITALS
BODY MASS INDEX: 25.41 KG/M2 | DIASTOLIC BLOOD PRESSURE: 90 MMHG | SYSTOLIC BLOOD PRESSURE: 140 MMHG | WEIGHT: 187.6 LBS | OXYGEN SATURATION: 98 % | TEMPERATURE: 97.3 F | RESPIRATION RATE: 14 BRPM | HEART RATE: 73 BPM | HEIGHT: 72 IN

## 2022-01-13 DIAGNOSIS — E78.2 MIXED HYPERLIPIDEMIA: ICD-10-CM

## 2022-01-13 DIAGNOSIS — H81.10 BENIGN PAROXYSMAL POSITIONAL VERTIGO, UNSPECIFIED LATERALITY: ICD-10-CM

## 2022-01-13 DIAGNOSIS — D69.6 THROMBOCYTOPENIA, ACQUIRED: ICD-10-CM

## 2022-01-13 DIAGNOSIS — Z00.00 ROUTINE GENERAL MEDICAL EXAMINATION AT A HEALTH CARE FACILITY: Primary | ICD-10-CM

## 2022-01-13 DIAGNOSIS — R73.09 ELEVATED GLUCOSE: ICD-10-CM

## 2022-01-13 DIAGNOSIS — Z12.5 PROSTATE CANCER SCREENING: ICD-10-CM

## 2022-01-13 DIAGNOSIS — Z00.00 ROUTINE GENERAL MEDICAL EXAMINATION AT A HEALTH CARE FACILITY: ICD-10-CM

## 2022-01-13 DIAGNOSIS — K21.9 GASTROESOPHAGEAL REFLUX DISEASE, UNSPECIFIED WHETHER ESOPHAGITIS PRESENT: ICD-10-CM

## 2022-01-13 DIAGNOSIS — R79.89 ELEVATED TSH: ICD-10-CM

## 2022-01-13 PROBLEM — J20.9 ACUTE BRONCHITIS: Status: ACTIVE | Noted: 2022-01-13

## 2022-01-13 PROCEDURE — 36415 COLL VENOUS BLD VENIPUNCTURE: CPT

## 2022-01-13 PROCEDURE — 80061 LIPID PANEL: CPT

## 2022-01-13 PROCEDURE — 84439 ASSAY OF FREE THYROXINE: CPT

## 2022-01-13 PROCEDURE — G0103 PSA SCREENING: HCPCS

## 2022-01-13 PROCEDURE — 99397 PER PM REEVAL EST PAT 65+ YR: CPT | Performed by: FAMILY MEDICINE

## 2022-01-13 PROCEDURE — 83036 HEMOGLOBIN GLYCOSYLATED A1C: CPT

## 2022-01-13 PROCEDURE — 80050 GENERAL HEALTH PANEL: CPT

## 2022-01-13 NOTE — PROGRESS NOTES
Subjective   Tucker Johnson is a 71 y.o. male    Chief Complaint    Annual physical exam   Prostate cancer screening   Hyperlipidemia   Gastroesophageal reflux disease   Benign prostatic hypertrophy   Thrombocytopenia   Elevated blood pressure    History of Present Illness  The patient presents today for his annual physical examination and prostate cancer screening along with follow-up of the above-listed problems. His blood pressure today is 140/90 mmHg.    The patient denies any pain in his kidneys. He states that he has stomach pain today because it is empty. The patient reports sitting up from lying down just now made him feel dizzy. He said it does not bother him while driving or riding his motorcycle.     The patient states that he is going to retire at the end of 08/2022.    The following portions of the patient's history were reviewed and updated as appropriate: allergies, current medications, past social history and problem list    Review of Systems   Constitutional: Negative.  Negative for appetite change, chills, diaphoresis, fatigue, fever and unexpected weight change.   HENT: Negative.    Eyes: Negative.  Negative for visual disturbance.   Respiratory: Negative.  Negative for cough, chest tightness, shortness of breath and wheezing.    Cardiovascular: Negative.  Negative for chest pain, palpitations and leg swelling.   Gastrointestinal: Negative.  Negative for abdominal pain, diarrhea, nausea and vomiting.   Endocrine: Negative.  Negative for polydipsia, polyphagia and polyuria.   Genitourinary: Negative.  Negative for dysuria, frequency and urgency.   Musculoskeletal: Negative.  Negative for arthralgias, back pain and myalgias.   Skin: Negative.  Negative for color change and rash.   Allergic/Immunologic: Negative.    Neurological: Positive for dizziness and light-headedness. Negative for weakness, numbness and headaches.   Hematological: Negative.  Negative for adenopathy. Does not bruise/bleed  easily.   Psychiatric/Behavioral: Negative.    All other systems reviewed and are negative.      Objective     Vitals:    01/13/22 1358   BP: 140/90   Pulse: 73   Resp: 14   Temp: 97.3 °F (36.3 °C)   SpO2: 98%       Physical Exam  Vitals and nursing note reviewed.   Constitutional:       General: He is not in acute distress.     Appearance: Normal appearance. He is well-developed. He is not ill-appearing, toxic-appearing or diaphoretic.   HENT:      Head: Normocephalic and atraumatic.   Eyes:      Conjunctiva/sclera: Conjunctivae normal.      Pupils: Pupils are equal, round, and reactive to light.   Neck:      Thyroid: No thyromegaly.      Vascular: No carotid bruit or JVD.   Cardiovascular:      Rate and Rhythm: Normal rate and regular rhythm.      Pulses: Normal pulses.      Heart sounds: Normal heart sounds. No murmur heard.      Pulmonary:      Effort: Pulmonary effort is normal. No respiratory distress.      Breath sounds: Normal breath sounds.   Abdominal:      General: Bowel sounds are normal.      Palpations: Abdomen is soft. There is no mass.      Tenderness: There is no abdominal tenderness.   Genitourinary:     Prostate: Normal.      Rectum: Normal.   Musculoskeletal:         General: No swelling. Normal range of motion.      Cervical back: Normal range of motion and neck supple.      Right lower leg: No edema.      Left lower leg: No edema.   Lymphadenopathy:      Cervical: No cervical adenopathy.   Skin:     General: Skin is warm and dry.      Findings: No lesion or rash.   Neurological:      Mental Status: He is alert and oriented to person, place, and time.      Cranial Nerves: No cranial nerve deficit.      Sensory: No sensory deficit.      Motor: No weakness.      Coordination: Coordination normal.      Gait: Gait normal.      Deep Tendon Reflexes: Reflexes are normal and symmetric.   Psychiatric:         Mood and Affect: Mood normal.         Behavior: Behavior normal.         Thought Content:  Thought content normal.         Judgment: Judgment normal.         Assessment/Plan   Problems Addressed this Visit        Cardiac and Vasculature    Hyperlipidemia    Relevant Orders    Comprehensive Metabolic Panel    Lipid Panel       ENT    BPPV (benign paroxysmal positional vertigo)       Gastrointestinal Abdominal     GERD (gastroesophageal reflux disease)    Relevant Orders    CBC (No Diff)    Comprehensive Metabolic Panel      Other Visit Diagnoses     Routine general medical examination at a health care facility    -  Primary    Relevant Orders    CBC (No Diff)    Comprehensive Metabolic Panel    Lipid Panel    TSH    T4, Free    Prostate cancer screening        Relevant Orders    PSA Screen    Elevated TSH        Relevant Orders    TSH    T4, Free    Thrombocytopenia, acquired (HCC)        Relevant Orders    CBC (No Diff)    Elevated glucose        Relevant Orders    Comprehensive Metabolic Panel    Hemoglobin A1c      Diagnoses       Codes Comments    Routine general medical examination at a health care facility    -  Primary ICD-10-CM: Z00.00  ICD-9-CM: V70.0     Prostate cancer screening     ICD-10-CM: Z12.5  ICD-9-CM: V76.44     Mixed hyperlipidemia     ICD-10-CM: E78.2  ICD-9-CM: 272.2     Gastroesophageal reflux disease, unspecified whether esophagitis present     ICD-10-CM: K21.9  ICD-9-CM: 530.81     Benign paroxysmal positional vertigo, unspecified laterality     ICD-10-CM: H81.10  ICD-9-CM: 386.11     Elevated TSH     ICD-10-CM: R79.89  ICD-9-CM: 794.5     Thrombocytopenia, acquired (HCC)     ICD-10-CM: D69.6  ICD-9-CM: 287.5     Elevated glucose     ICD-10-CM: R73.09  ICD-9-CM: 790.29         Preventive medicine discussed, diet, exercise, healthy living discussed at length.  Discussed nutrition, physical activity, healthy weight, injury prevention, misuse of tobacco, alcohol and drugs, dental health, mental health, immunizations, screening    Part of this note may be an electronic  transcription/translation of spoken language to printed text using the Dragon Dictation System.           Transcribed from ambient dictation for SUSANNE Davis MD by Soraida Alvarez.  01/13/22   15:12 EST    Patient verbalized consent to the visit recording.  I have personally performed the services described in this document as transcribed by the above individual, and it is both accurate and complete.  SUSANNE Davis MD  1/13/2022  17:00 EST

## 2022-01-14 LAB
ALBUMIN SERPL-MCNC: 4.7 G/DL (ref 3.5–5.2)
ALBUMIN/GLOB SERPL: 2.6 G/DL
ALP SERPL-CCNC: 45 U/L (ref 39–117)
ALT SERPL W P-5'-P-CCNC: 41 U/L (ref 1–41)
ANION GAP SERPL CALCULATED.3IONS-SCNC: 7.7 MMOL/L (ref 5–15)
AST SERPL-CCNC: 36 U/L (ref 1–40)
BILIRUB SERPL-MCNC: 2.9 MG/DL (ref 0–1.2)
BUN SERPL-MCNC: 17 MG/DL (ref 8–23)
BUN/CREAT SERPL: 17.7 (ref 7–25)
CALCIUM SPEC-SCNC: 9.5 MG/DL (ref 8.6–10.5)
CHLORIDE SERPL-SCNC: 105 MMOL/L (ref 98–107)
CHOLEST SERPL-MCNC: 159 MG/DL (ref 0–200)
CO2 SERPL-SCNC: 29.3 MMOL/L (ref 22–29)
CREAT SERPL-MCNC: 0.96 MG/DL (ref 0.76–1.27)
DEPRECATED RDW RBC AUTO: 43.2 FL (ref 37–54)
ERYTHROCYTE [DISTWIDTH] IN BLOOD BY AUTOMATED COUNT: 12.2 % (ref 12.3–15.4)
GFR SERPL CREATININE-BSD FRML MDRD: 77 ML/MIN/1.73
GLOBULIN UR ELPH-MCNC: 1.8 GM/DL
GLUCOSE SERPL-MCNC: 91 MG/DL (ref 65–99)
HBA1C MFR BLD: 5.5 % (ref 4.8–5.6)
HCT VFR BLD AUTO: 44.1 % (ref 37.5–51)
HDLC SERPL-MCNC: 85 MG/DL (ref 40–60)
HGB BLD-MCNC: 15.3 G/DL (ref 13–17.7)
LDLC SERPL CALC-MCNC: 59 MG/DL (ref 0–100)
LDLC/HDLC SERPL: 0.69 {RATIO}
MCH RBC QN AUTO: 33.7 PG (ref 26.6–33)
MCHC RBC AUTO-ENTMCNC: 34.7 G/DL (ref 31.5–35.7)
MCV RBC AUTO: 97.1 FL (ref 79–97)
PLATELET # BLD AUTO: 73 10*3/MM3 (ref 140–450)
PMV BLD AUTO: 10.5 FL (ref 6–12)
POTASSIUM SERPL-SCNC: 4.2 MMOL/L (ref 3.5–5.2)
PROT SERPL-MCNC: 6.5 G/DL (ref 6–8.5)
PSA SERPL-MCNC: 0.34 NG/ML (ref 0–4)
RBC # BLD AUTO: 4.54 10*6/MM3 (ref 4.14–5.8)
SODIUM SERPL-SCNC: 142 MMOL/L (ref 136–145)
T4 FREE SERPL-MCNC: 0.99 NG/DL (ref 0.93–1.7)
TRIGL SERPL-MCNC: 78 MG/DL (ref 0–150)
TSH SERPL DL<=0.05 MIU/L-ACNC: 3.59 UIU/ML (ref 0.27–4.2)
VLDLC SERPL-MCNC: 15 MG/DL (ref 5–40)
WBC NRBC COR # BLD: 4.13 10*3/MM3 (ref 3.4–10.8)

## 2022-01-17 DIAGNOSIS — E78.2 MIXED HYPERLIPIDEMIA: ICD-10-CM

## 2022-01-20 RX ORDER — ATORVASTATIN CALCIUM 20 MG/1
TABLET, FILM COATED ORAL
Qty: 30 TABLET | Refills: 0 | Status: SHIPPED | OUTPATIENT
Start: 2022-01-20 | End: 2022-01-21

## 2022-01-21 DIAGNOSIS — E78.2 MIXED HYPERLIPIDEMIA: ICD-10-CM

## 2022-01-21 RX ORDER — ATORVASTATIN CALCIUM 20 MG/1
TABLET, FILM COATED ORAL
Qty: 30 TABLET | Refills: 0 | Status: SHIPPED | OUTPATIENT
Start: 2022-01-21 | End: 2022-03-25

## 2022-01-27 ENCOUNTER — OFFICE VISIT (OUTPATIENT)
Dept: ORTHOPEDIC SURGERY | Facility: CLINIC | Age: 72
End: 2022-01-27

## 2022-01-27 VITALS
SYSTOLIC BLOOD PRESSURE: 140 MMHG | HEIGHT: 72 IN | DIASTOLIC BLOOD PRESSURE: 86 MMHG | WEIGHT: 187.61 LBS | BODY MASS INDEX: 25.41 KG/M2

## 2022-01-27 DIAGNOSIS — IMO0002 DISORDER OF ROTATOR CUFF SYNDROME OF LEFT SHOULDER AND ALLIED DISORDER: ICD-10-CM

## 2022-01-27 DIAGNOSIS — M25.512 LEFT SHOULDER PAIN, UNSPECIFIED CHRONICITY: Primary | ICD-10-CM

## 2022-01-27 DIAGNOSIS — M75.20 TENDINITIS OF LONG HEAD OF BICEPS: ICD-10-CM

## 2022-01-27 PROCEDURE — 99204 OFFICE O/P NEW MOD 45 MIN: CPT | Performed by: ORTHOPAEDIC SURGERY

## 2022-01-27 RX ORDER — ALPRAZOLAM 0.5 MG/1
0.5 TABLET ORAL
Qty: 1 TABLET | Refills: 0 | Status: SHIPPED | OUTPATIENT
Start: 2022-01-27 | End: 2022-01-27

## 2022-01-27 NOTE — PROGRESS NOTES
[facility] [place] Orthopaedic Surgery Clinic Note        Subjective     Pain of the Left Shoulder      HPI    Tucker Johnson is a 71 y.o. male who presents with new problem of: left shoulder pain.  Onset: atraumatic and gradual in nature. The issue has been ongoing for 6 month(s). Pain is a 6/10 on the pain scale. Pain is described as stabbing and shooting. Associated symptoms include pain. The pain is worse with any movement of the joint; ice improve the pain. Previous treatments have included: NSAIDS.    I have reviewed the following portions of the patient's history:History of Present Illness and review of systems.      Patient is here today for new problem day regarding his left shoulder.  This has been going on for several years but has worsened over the last 6 months.  Patient was seen for a long head the biceps rupture on the contralateral side.  He has anterior lateral shoulder pain.  He is left-hand dominant.  He has had no treatment thus far.    Past Medical History:   Diagnosis Date   • Allergic    • Arthritis    • GERD (gastroesophageal reflux disease)    • Hyperlipidemia    • JERE (obstructive sleep apnea)     treated with surgery   • Tobacco abuse       Past Surgical History:   Procedure Laterality Date   • ELBOW ARTHROPLASTY     • SHOULDER SURGERY     • TRIGGER FINGER RELEASE Right 2021    right little finger    • UVULOPALATOPHARYNGOPLASTY        Family History   Problem Relation Age of Onset   • Stroke Mother    • Heart disease Father      Social History     Socioeconomic History   • Marital status:    Tobacco Use   • Smoking status: Former Smoker     Years: 30.00     Types: Cigars, Cigarettes     Start date:      Quit date:      Years since quittin.0   • Smokeless tobacco: Never Used   Vaping Use   • Vaping Use: Never used   Substance and Sexual Activity   • Alcohol use: Yes     Alcohol/week: 3.0 standard drinks     Types: 3 Shots of liquor per week   • Drug use: No   • Sexual  "activity: Defer      Current Outpatient Medications on File Prior to Visit   Medication Sig Dispense Refill   • atorvastatin (LIPITOR) 20 MG tablet TAKE 1 TABLET BY MOUTH EVERY DAY 30 tablet 0   • Diclofenac Sodium (VOLTAREN) 1 % gel gel Apply 4 g topically to the appropriate area as directed 4 (Four) Times a Day As Needed.     • lansoprazole (PREVACID) 30 MG capsule TAKE 1 CAPSULE DAILY 90 capsule 3   • meclizine (ANTIVERT) 25 MG tablet Take 1 tablet by mouth 3 (Three) Times a Day As Needed for Dizziness. 15 tablet 0   • Misc Natural Products (OSTEO BI-FLEX ADV DOUBLE ST PO) Take  by mouth.     • Multiple Vitamins-Minerals (CENTRUM SILVER ADULT 50+ PO) Take  by mouth.     • ZyrTEC-D Allergy & Congestion 5-120 MG per 12 hr tablet TAKE 1 TABLET BY MOUTH TWO TIMES A DAY  60 tablet 11     No current facility-administered medications on file prior to visit.      No Known Allergies       Review of Systems   Constitutional: Negative.    HENT: Negative.    Eyes: Negative.    Respiratory: Negative.    Cardiovascular: Negative.    Gastrointestinal: Negative.    Endocrine: Negative.    Genitourinary: Negative.    Musculoskeletal: Positive for arthralgias.   Skin: Negative.    Allergic/Immunologic: Negative.    Neurological: Negative.    Hematological: Negative.    Psychiatric/Behavioral: Negative.         I reviewed the patient's chief complaint, history of present illness, review of systems, past medical history, surgical history, family history, social history, medications and allergy list.        Objective      Physical Exam  /86   Ht 182.9 cm (72.01\")   Wt 85.1 kg (187 lb 9.8 oz)   BMI 25.44 kg/m²     Body mass index is 25.44 kg/m².    General  Mental Status - alert  General Appearance - cooperative, well groomed, not in acute distress  Orientation - Oriented X3  Build & Nutrition - well developed and well nourished  Posture - normal posture  Gait - normal gait       Ortho Exam  Musculoskeletal   Upper " Extremity   Left Shoulder     Inspection and Palpation:     Medial Border Scapular Tenderness-none    AC Joint Tenderness -none    Sensation is normal    Examination reveals no ecchymosis.        Strength and Tone:    Supraspinatus -4+ out of 5 with pain    External Rotators-5/5    Infraspinatus - 5/5    Subscapularis - 5/5 with mild pain    Deltoid - 5/5     Range of Motion      Left Shoulder:    Internal Rotation: ROM - L4    External Rotation: AROM - 60 degrees    Elevation through flexion: AROM - 140 degrees        Impingement   Left shoulder    Cox-Konrad impingement test positive    Neer impingement test negative     Functional Testing   Left shoulder    AC crossover adduction test negative    Speeds test negative    Uppercut test negative    O'Briens test negative    Drop arm sign negative    Apprehension relocation negative        Imaging/Studies  Imaging Results (Last 24 Hours)     Procedure Component Value Units Date/Time    XR Shoulder 2+ View Left [[id number]] Resulted: 01/27/22 1706     Updated: 01/27/22 1707    Narrative:      Left Shoulder X-Ray    Indication: Pain    Study:  AP, axillary lateral, and scapular Y views    Comparison: None    Findings:  No acute fractures are visualized  No bony lesions are visualized.  Normal soft tissue appearance  AC joint: Severe joint space narrowing  Glenohumeral joint: Mild joint space narrowing  Acromion type: 3      Impression:    No acute bony abnormalities noted  Severe AC joint space narrowing  Type III acromion              Assessment    Assessment:  1. Left shoulder pain, unspecified chronicity    2. Tendinitis of long head of biceps    3. Disorder of rotator cuff syndrome of left shoulder and allied disorder        Plan:  1. Continue over-the-counter medication as needed for discomfort  2. Type III acromion with severe AC joint space narrowing--patient hurts in the region of the proximal biceps.  He is have no biceps muscle belly pain, however but  does have a history on the contralateral side of a biceps rupture.  Plan will be for an MRI the patient's left shoulder.  We will see him back to review that study and go from there paying close attention to the anterior structures.  A 0.5 mg Xanax tablet will be given to him to help him tolerate the MRI.        Gennaro Nelson MD  01/27/22  18:09 EST      Dictated Utilizing Dragon Dictation.

## 2022-01-28 DIAGNOSIS — J31.0 CHRONIC RHINITIS: ICD-10-CM

## 2022-01-31 RX ORDER — CETIRIZINE HCL/PSEUDOEPHEDRINE 5 MG-120MG
TABLET, EXTENDED RELEASE 12 HR ORAL
Qty: 60 TABLET | Refills: 10 | Status: SHIPPED | OUTPATIENT
Start: 2022-01-31 | End: 2023-04-03

## 2022-02-07 ENCOUNTER — TELEPHONE (OUTPATIENT)
Dept: ORTHOPEDIC SURGERY | Facility: CLINIC | Age: 72
End: 2022-02-07

## 2022-02-07 NOTE — TELEPHONE ENCOUNTER
Provider: DR. NUNN    Caller:  KUN MILLARD    Relationship to Patient: SELF    Phone Number: [phone]    Reason for Call: PATIENT ADVISED HE WAS CONTACT BY [facility] TO SCHEDULE MRI HOWEVER HE WAS INFORMED THAT [facility] DOES NOT HAVE ACCESS TO AN OPEN MRI. PATIENT WOULD LIKE FOR DR. NUNN TO REFER HIM TO A FACILITY THAT HAS ACCESS TO AN OPEN MRI ASAP.

## 2022-03-03 ENCOUNTER — OFFICE VISIT (OUTPATIENT)
Dept: ORTHOPEDIC SURGERY | Facility: CLINIC | Age: 72
End: 2022-03-03

## 2022-03-03 VITALS
WEIGHT: 182 LBS | HEIGHT: 72 IN | BODY MASS INDEX: 24.65 KG/M2 | DIASTOLIC BLOOD PRESSURE: 78 MMHG | SYSTOLIC BLOOD PRESSURE: 126 MMHG

## 2022-03-03 DIAGNOSIS — M75.20 TENDINITIS OF LONG HEAD OF BICEPS: ICD-10-CM

## 2022-03-03 DIAGNOSIS — M25.512 LEFT SHOULDER PAIN, UNSPECIFIED CHRONICITY: Primary | ICD-10-CM

## 2022-03-03 DIAGNOSIS — M75.112 INCOMPLETE TEAR OF LEFT ROTATOR CUFF, UNSPECIFIED WHETHER TRAUMATIC: ICD-10-CM

## 2022-03-03 PROCEDURE — 99214 OFFICE O/P EST MOD 30 MIN: CPT | Performed by: ORTHOPAEDIC SURGERY

## 2022-03-03 NOTE — PROGRESS NOTES
"    [facility] [place] Orthopaedic Surgery Clinic Note        Subjective     CC: Follow-up (Left Shoulder Pain after MRI Left Shoulder W/O 2/24/22)      HPI    Tucker Johnson is a 71 y.o. male.  Patient here today for follow-up after the MRI of his left shoulder.  He says over the last 3 weeks or so, his left shoulder has felt good with no pain.  He is still point tender in the deltopectoral groove.    Overall, patient's symptoms are as above.    ROS:    Constiutional:Pt denies fever, chills, nausea, or vomiting.  MSK:as above        Objective      Past Medical History  Past Medical History:   Diagnosis Date   • Allergic    • Arthritis    • GERD (gastroesophageal reflux disease)    • Hyperlipidemia    • JERE (obstructive sleep apnea)     treated with surgery   • Tobacco abuse          Physical Exam  /78   Ht 182.9 cm (72.01\")   Wt 82.6 kg (182 lb)   BMI 24.68 kg/m²     Body mass index is 24.68 kg/m².    Patient is well nourished and well developed.        Ortho Exam  Musculoskeletal   Upper Extremity   Left Shoulder       Strength and Tone:    Supraspinatus -5-5    External Rotators-5/5    Infraspinatus - 5/5    Subscapularis - 5/5    Deltoid - 5/5     Range of Motion      Left Shoulder:    Internal Rotation: ROM - L4    External Rotation: AROM - 70 degrees    Elevation through flexion: AROM - 140 degrees     AC joint:  non tender to palpation    AC joint:  negative crossover          Imaging/Labs/EMG Reviewed:  Imaging Results (Last 24 Hours)     ** No results found for the last 24 hours. **        Reviewed images and report of the MRI of the patient's left shoulder from [facility] dated 2/24/2022.  Interpretation says that there is rotator cuff degeneration with extensive interstitial footprint tearing.  These are most clearly demonstrated, in my eyes, on the sagittal images.  Subscap appears intact.  No dramatic amount of fluid noted around the long of the biceps tendon.  Biceps is not perched.    Assessment "    Assessment:  1. Left shoulder pain, unspecified chronicity    2. Tendinitis of long head of biceps    3. Incomplete tear of left rotator cuff, unspecified whether traumatic        Plan:  1. Recommend over the counter anti-inflammatories for pain and/or swelling  2. Left rotator cuff syndrome with partial-thickness tearing and long of the biceps tendinopathy--no objective findings are noted of the long head of the biceps but he  in the bicipital groove and has a history of a biceps rupture on the contralateral side.  This should be kept in mind.  He has had symptoms that are under control over the last 3 weeks have suggested we observe him for now.  If he comes back with worsening pain, consider modalities to the glenohumeral joint.      Gennaro Nelson MD  03/03/22  13:12 EST      Dictated Utilizing Dragon Dictation.

## 2022-03-24 DIAGNOSIS — E78.2 MIXED HYPERLIPIDEMIA: ICD-10-CM

## 2022-03-25 RX ORDER — ATORVASTATIN CALCIUM 20 MG/1
TABLET, FILM COATED ORAL
Qty: 30 TABLET | Refills: 0 | Status: SHIPPED | OUTPATIENT
Start: 2022-03-25 | End: 2022-04-08

## 2022-04-05 DIAGNOSIS — E78.2 MIXED HYPERLIPIDEMIA: ICD-10-CM

## 2022-04-08 RX ORDER — ATORVASTATIN CALCIUM 20 MG/1
TABLET, FILM COATED ORAL
Qty: 30 TABLET | Refills: 0 | Status: SHIPPED | OUTPATIENT
Start: 2022-04-08 | End: 2022-05-25

## 2022-04-18 DIAGNOSIS — K21.9 GASTROESOPHAGEAL REFLUX DISEASE, UNSPECIFIED WHETHER ESOPHAGITIS PRESENT: ICD-10-CM

## 2022-04-18 RX ORDER — LANSOPRAZOLE 30 MG/1
CAPSULE, DELAYED RELEASE ORAL
Qty: 90 CAPSULE | Refills: 3 | Status: SHIPPED | OUTPATIENT
Start: 2022-04-18 | End: 2023-02-08 | Stop reason: SDUPTHER

## 2022-05-25 DIAGNOSIS — E78.2 MIXED HYPERLIPIDEMIA: ICD-10-CM

## 2022-05-25 RX ORDER — ATORVASTATIN CALCIUM 20 MG/1
TABLET, FILM COATED ORAL
Qty: 30 TABLET | Refills: 5 | Status: SHIPPED | OUTPATIENT
Start: 2022-05-25 | End: 2022-11-01 | Stop reason: SDUPTHER

## 2022-09-15 DIAGNOSIS — Z12.11 SCREENING FOR COLON CANCER: Primary | ICD-10-CM

## 2022-10-03 ENCOUNTER — OUTSIDE FACILITY SERVICE (OUTPATIENT)
Dept: GASTROENTEROLOGY | Facility: CLINIC | Age: 72
End: 2022-10-03

## 2022-10-03 PROCEDURE — 45388 COLONOSCOPY W/ABLATION: CPT | Performed by: INTERNAL MEDICINE

## 2022-10-03 PROCEDURE — 88305 TISSUE EXAM BY PATHOLOGIST: CPT | Performed by: INTERNAL MEDICINE

## 2022-10-03 PROCEDURE — 45385 COLONOSCOPY W/LESION REMOVAL: CPT | Performed by: INTERNAL MEDICINE

## 2022-10-04 ENCOUNTER — LAB REQUISITION (OUTPATIENT)
Dept: LAB | Facility: HOSPITAL | Age: 72
End: 2022-10-04

## 2022-10-04 DIAGNOSIS — Z12.11 ENCOUNTER FOR SCREENING FOR MALIGNANT NEOPLASM OF COLON: ICD-10-CM

## 2022-10-05 ENCOUNTER — TELEPHONE (OUTPATIENT)
Dept: GASTROENTEROLOGY | Facility: CLINIC | Age: 72
End: 2022-10-05

## 2022-10-05 LAB
CYTO UR: NORMAL
LAB AP CASE REPORT: NORMAL
LAB AP CLINICAL INFORMATION: NORMAL
PATH REPORT.FINAL DX SPEC: NORMAL
PATH REPORT.GROSS SPEC: NORMAL

## 2022-10-05 NOTE — TELEPHONE ENCOUNTER
----- Message from Carroll Tejada MD sent at 10/5/2022 11:29 AM EDT -----  Please let Ralph know he had an adenoma. Follow up in 5 years is recommended

## 2022-11-01 ENCOUNTER — TELEPHONE (OUTPATIENT)
Dept: FAMILY MEDICINE CLINIC | Facility: CLINIC | Age: 72
End: 2022-11-01

## 2022-11-01 DIAGNOSIS — E78.2 MIXED HYPERLIPIDEMIA: ICD-10-CM

## 2022-11-01 RX ORDER — ATORVASTATIN CALCIUM 20 MG/1
20 TABLET, FILM COATED ORAL DAILY
Qty: 30 TABLET | Refills: 2 | Status: SHIPPED | OUTPATIENT
Start: 2022-11-01 | End: 2023-02-08 | Stop reason: SDUPTHER

## 2022-11-01 NOTE — TELEPHONE ENCOUNTER
Caller: Tucker Johnson    Relationship: Self    Best call back number: [phone]    Who are you requesting to speak with (clinical staff, provider,  specific staff member): DR. HARTMAN    What was the call regarding: PATIENT CALLING TO STATE THAT HE PICKED UP HIS LAST REFILL OF HIS atorvastatin (LIPITOR) 20 MG tablet TODAY  FROM   [facility] [address] [address] - [phone]  - [phone] FX      AND WANTED TO MAKE DR. HARTMAN AWARE.     Do you require a callback: IF NEEDED.    THANK YOU.

## 2022-12-13 ENCOUNTER — OFFICE VISIT (OUTPATIENT)
Dept: FAMILY MEDICINE CLINIC | Facility: CLINIC | Age: 72
End: 2022-12-13

## 2022-12-13 VITALS
TEMPERATURE: 97 F | SYSTOLIC BLOOD PRESSURE: 142 MMHG | BODY MASS INDEX: 26.01 KG/M2 | HEIGHT: 72 IN | WEIGHT: 192 LBS | OXYGEN SATURATION: 99 % | HEART RATE: 90 BPM | DIASTOLIC BLOOD PRESSURE: 76 MMHG

## 2022-12-13 DIAGNOSIS — I10 PRIMARY HYPERTENSION: Primary | ICD-10-CM

## 2022-12-13 PROBLEM — G47.33 OSA (OBSTRUCTIVE SLEEP APNEA): Status: RESOLVED | Noted: 2021-04-05 | Resolved: 2022-12-13

## 2022-12-13 PROCEDURE — 99213 OFFICE O/P EST LOW 20 MIN: CPT | Performed by: PHYSICIAN ASSISTANT

## 2022-12-13 PROCEDURE — 93000 ELECTROCARDIOGRAM COMPLETE: CPT | Performed by: PHYSICIAN ASSISTANT

## 2022-12-13 RX ORDER — LOSARTAN POTASSIUM 50 MG/1
50 TABLET ORAL DAILY
Qty: 30 TABLET | Refills: 2 | Status: SHIPPED | OUTPATIENT
Start: 2022-12-13 | End: 2023-02-08 | Stop reason: SDUPTHER

## 2022-12-13 NOTE — PROGRESS NOTES
Subjective   Tucker Johnson is a 72 y.o. male  Hypertension (Concerned about elevated BP readings since 11/26)      History of Present Illness    Tucker Johnson, date of birth 1950, presents today to discuss high blood pressure. He reports that he started checking his blood pressure at home on 11/26/2022. He states that his sister went to the hospital 3 weeks ago with a stroke. He reports that his bilirubin is always elevated. He states that he does not have a drinking problem, but he does have 2 to 3 drinks almost every night. He reports that he is usually fasting when he gets his blood work done. He states that he has a physical coming up in 02/2023. He reports that his father had heart surgery when he was living. He denies any swelling problems.    The patient reports that he had sleep apnea surgery 20 years ago. He states that he does snore again. The patient reports that he does not find himself gasping in the middle of the night. He states that his partner knows that he stops breathing.    The following portions of the patient's history were reviewed and updated as appropriate: allergies, current medications, past social history and problem list    Review of Systems   Constitutional: Negative for fatigue and unexpected weight change.   Respiratory: Negative for cough, chest tightness and shortness of breath.    Cardiovascular: Negative for chest pain, palpitations and leg swelling.   Gastrointestinal: Negative for nausea.   Skin: Negative for color change and rash.   Neurological: Negative for dizziness, syncope, weakness and headaches.       Objective     Vitals:    12/13/22 1035   BP: 142/76   Pulse: 90   Temp: 97 °F (36.1 °C)   SpO2: 99%       Physical Exam  Vitals and nursing note reviewed.   Constitutional:       General: He is not in acute distress.     Appearance: Normal appearance. He is well-developed and normal weight. He is not ill-appearing, toxic-appearing or diaphoretic.   Neck:       Vascular: No carotid bruit or JVD.   Cardiovascular:      Rate and Rhythm: Normal rate and regular rhythm.      Pulses: Normal pulses.      Heart sounds: Normal heart sounds. No murmur heard.  Pulmonary:      Effort: Pulmonary effort is normal. No respiratory distress.      Breath sounds: Normal breath sounds.   Abdominal:      Palpations: Abdomen is soft.      Tenderness: There is no abdominal tenderness.   Skin:     General: Skin is warm and dry.   Neurological:      Mental Status: He is alert and oriented to person, place, and time.      Gait: Gait normal.   Psychiatric:         Mood and Affect: Mood normal.         Behavior: Behavior normal.       ECG 12 Lead    Date/Time: 12/13/2022 11:07 AM  Performed by: Nunu Vargas PA-C  Authorized by: Nunu Vargas PA-C   Comparison: not compared with previous ECG   Rhythm: sinus rhythm  Rate: normal  BPM: 76  Conduction: conduction normal  ST Segments: ST segments normal  T Waves: T waves normal  QRS axis: normal  Other: no other findings    Clinical impression: normal ECG              Assessment & Plan     Diagnoses and all orders for this visit:    1. Primary hypertension (Primary)    Other orders  -     losartan (Cozaar) 50 MG tablet; Take 1 tablet by mouth Daily. FOR BP  Dispense: 30 tablet; Refill: 2  -     ECG 12 Lead       The patient's blood pressure is elevated today. We will start him on losartan 25 mg once daily. He will continue to monitor his blood pressure at home. He will follow up with Dr. Davis for his physical in 02/2023.    Transcribed from ambient dictation for Nunu Vargas PA-C by Arti Walton.  12/13/22   11:59 EST    Patient or patient representative verbalized consent to the visit recording.  I have personally performed the services described in this document as transcribed by the above individual, and it is both accurate and complete.  Nunu Vargas PA-C  12/14/2022  10:36 EST

## 2023-02-08 ENCOUNTER — OFFICE VISIT (OUTPATIENT)
Dept: FAMILY MEDICINE CLINIC | Facility: CLINIC | Age: 73
End: 2023-02-08
Payer: COMMERCIAL

## 2023-02-08 ENCOUNTER — LAB (OUTPATIENT)
Dept: LAB | Facility: HOSPITAL | Age: 73
End: 2023-02-08
Payer: COMMERCIAL

## 2023-02-08 VITALS
SYSTOLIC BLOOD PRESSURE: 140 MMHG | HEIGHT: 72 IN | RESPIRATION RATE: 16 BRPM | DIASTOLIC BLOOD PRESSURE: 80 MMHG | WEIGHT: 190.6 LBS | BODY MASS INDEX: 25.81 KG/M2 | OXYGEN SATURATION: 99 % | TEMPERATURE: 96.6 F | HEART RATE: 79 BPM

## 2023-02-08 DIAGNOSIS — R79.89 ELEVATED TSH: ICD-10-CM

## 2023-02-08 DIAGNOSIS — E78.2 MIXED HYPERLIPIDEMIA: ICD-10-CM

## 2023-02-08 DIAGNOSIS — D69.6 THROMBOCYTOPENIA, ACQUIRED: ICD-10-CM

## 2023-02-08 DIAGNOSIS — R73.09 ELEVATED GLUCOSE: ICD-10-CM

## 2023-02-08 DIAGNOSIS — Z00.00 ROUTINE GENERAL MEDICAL EXAMINATION AT A HEALTH CARE FACILITY: Primary | ICD-10-CM

## 2023-02-08 DIAGNOSIS — I10 PRIMARY HYPERTENSION: ICD-10-CM

## 2023-02-08 DIAGNOSIS — Z12.5 PROSTATE CANCER SCREENING: ICD-10-CM

## 2023-02-08 DIAGNOSIS — K21.9 GASTROESOPHAGEAL REFLUX DISEASE, UNSPECIFIED WHETHER ESOPHAGITIS PRESENT: ICD-10-CM

## 2023-02-08 DIAGNOSIS — Z00.00 ROUTINE GENERAL MEDICAL EXAMINATION AT A HEALTH CARE FACILITY: ICD-10-CM

## 2023-02-08 DIAGNOSIS — J31.0 CHRONIC RHINITIS: ICD-10-CM

## 2023-02-08 PROCEDURE — G0103 PSA SCREENING: HCPCS

## 2023-02-08 PROCEDURE — 84439 ASSAY OF FREE THYROXINE: CPT

## 2023-02-08 PROCEDURE — 83036 HEMOGLOBIN GLYCOSYLATED A1C: CPT

## 2023-02-08 PROCEDURE — 99397 PER PM REEVAL EST PAT 65+ YR: CPT | Performed by: FAMILY MEDICINE

## 2023-02-08 PROCEDURE — 36415 COLL VENOUS BLD VENIPUNCTURE: CPT

## 2023-02-08 PROCEDURE — 80061 LIPID PANEL: CPT

## 2023-02-08 PROCEDURE — 80050 GENERAL HEALTH PANEL: CPT

## 2023-02-08 RX ORDER — LOSARTAN POTASSIUM 50 MG/1
50 TABLET ORAL DAILY
Qty: 90 TABLET | Refills: 3 | Status: SHIPPED | OUTPATIENT
Start: 2023-02-08

## 2023-02-08 RX ORDER — LANSOPRAZOLE 30 MG/1
30 CAPSULE, DELAYED RELEASE ORAL DAILY
Qty: 90 CAPSULE | Refills: 3 | Status: SHIPPED | OUTPATIENT
Start: 2023-02-08

## 2023-02-08 RX ORDER — ATORVASTATIN CALCIUM 20 MG/1
20 TABLET, FILM COATED ORAL DAILY
Qty: 90 TABLET | Refills: 3 | Status: SHIPPED | OUTPATIENT
Start: 2023-02-08 | End: 2023-02-24

## 2023-02-08 NOTE — PROGRESS NOTES
Subjective   Tucker Johnson is a 72 y.o. male    Annual physical exam  Prostate cancer screening  Hypertension  Hyperlipidemia  Gastroesophageal reflux disease  Chronic rhinitis  Thrombocytopenia  Elevated TSH  Elevated glucose    History of Present Illness     The patient presents today for his annual physical exam. He is due for follow-up regarding his medical problems and due for lab testing. The patient has retired formally.    He reports he is doing well. The patient gets a 90-day supply on his reflux medication from [facility]. He staes a lot of times he will end up with almost 2 full bottles at a time. The patient takes Zyrtec as needed.    The patient said he saw Nunu in 11/2022 or 12/2022. He bought himself a blood pressure machine. The patient has been taking his blood pressure reading since 11/2022. He said he does not do it every day. The patient reports it is sporadic. He said the worst reading oddly enough this morning at home. The patient had a sneezing attack, which was preceded by a couple of trips to the bathroom. He has had issues with his digestive system since he was 6 years old. The patient states some days it is great and some days it is not so much. He said he was a little extra active this morning. The patient states his weight is not as good as it was last physical. He has gained some of that back. The patient states as of 4 days ago, started exercising. He is moving and walking. The patient ran the neighborhood, which is the first time he has done any kind of exercise in a lot of years.    The patient has an appointment with Dr. Matthew next week for his fingers. He has an appointment with Dr. Nelson on 02/21/2023 regarding his left shoulder. The patient states the last time he went to him, was 1.5 to 2 years ago, it was bothering him. He wanted his opinion. The patient had an MRI and was told he had a little bit of tear. He states if it is not bothering him all the time. The  patient is not looking for ways to get surgery. He states it is every day and wakes him up every night. The patient states his hand looks like it is swollen today.      The following portions of the patient's history were reviewed and updated as appropriate: allergies, current medications, past social history and problem list    Review of Systems   Constitutional: Negative.  Negative for appetite change, chills, fatigue, fever and unexpected weight change.   HENT: Negative.    Eyes: Negative.    Respiratory: Negative.  Negative for cough, chest tightness, shortness of breath and wheezing.    Cardiovascular: Negative.  Negative for chest pain, palpitations and leg swelling.   Gastrointestinal: Negative.  Negative for abdominal distention, abdominal pain, diarrhea, nausea and vomiting.        Patient experiencing heartburn/acid reflux     Endocrine: Negative.  Negative for polydipsia, polyphagia and polyuria.   Genitourinary: Negative.  Negative for dysuria, frequency and urgency.   Musculoskeletal: Negative.  Negative for arthralgias, back pain and myalgias.   Skin: Negative.  Negative for color change and rash.   Allergic/Immunologic: Negative.    Neurological: Negative.  Negative for dizziness, syncope, weakness and headaches.   Hematological: Negative.  Negative for adenopathy. Does not bruise/bleed easily.   Psychiatric/Behavioral: Negative.    All other systems reviewed and are negative.      Objective     Vitals:    02/08/23 1019   BP: 140/80   Pulse: 79   Resp: 16   Temp: 96.6 °F (35.9 °C)   SpO2: 99%       Physical Exam  Vitals and nursing note reviewed.   Constitutional:       General: He is not in acute distress.     Appearance: Normal appearance. He is well-developed. He is not ill-appearing, toxic-appearing or diaphoretic.   HENT:      Head: Normocephalic and atraumatic.      Right Ear: External ear normal.      Left Ear: External ear normal.   Eyes:      General: No scleral icterus.      Conjunctiva/sclera: Conjunctivae normal.      Pupils: Pupils are equal, round, and reactive to light.   Neck:      Thyroid: No thyromegaly.      Vascular: No carotid bruit or JVD.   Cardiovascular:      Rate and Rhythm: Normal rate and regular rhythm.      Pulses: Normal pulses.      Heart sounds: Normal heart sounds. No murmur heard.  Pulmonary:      Effort: Pulmonary effort is normal. No respiratory distress.      Breath sounds: Normal breath sounds. No wheezing or rales.   Abdominal:      General: Bowel sounds are normal. There is no distension.      Palpations: Abdomen is soft. There is no mass.      Tenderness: There is no abdominal tenderness. There is no guarding or rebound.      Hernia: No hernia is present.   Musculoskeletal:         General: No swelling. Normal range of motion.      Cervical back: Normal range of motion and neck supple.   Lymphadenopathy:      Cervical: No cervical adenopathy.   Skin:     General: Skin is warm and dry.      Coloration: Skin is not jaundiced or pale.      Findings: No lesion or rash.   Neurological:      Mental Status: He is alert and oriented to person, place, and time.      Cranial Nerves: No cranial nerve deficit.      Sensory: No sensory deficit.      Motor: No weakness.      Coordination: Coordination normal.      Gait: Gait normal.      Deep Tendon Reflexes: Reflexes are normal and symmetric.   Psychiatric:         Mood and Affect: Mood normal.         Behavior: Behavior normal.         Thought Content: Thought content normal.         Judgment: Judgment normal.         Assessment & Plan     Problems Addressed this Visit        Cardiac and Vasculature    Hyperlipidemia    Relevant Medications    atorvastatin (LIPITOR) 20 MG tablet    Other Relevant Orders    Comprehensive Metabolic Panel    Lipid Panel       Gastrointestinal Abdominal     GERD (gastroesophageal reflux disease)    Relevant Medications    lansoprazole (PREVACID) 30 MG capsule   Other Visit Diagnoses      Routine general medical examination at a health care facility    -  Primary    Relevant Orders    CBC (No Diff)    Comprehensive Metabolic Panel    Lipid Panel    TSH    T4, Free    Prostate cancer screening        Relevant Orders    PSA Screen    Primary hypertension        Relevant Medications    losartan (Cozaar) 50 MG tablet    Other Relevant Orders    Comprehensive Metabolic Panel    TSH    T4, Free    Chronic rhinitis        Thrombocytopenia, acquired (HCC)        Relevant Orders    CBC (No Diff)    Elevated TSH        Relevant Orders    PSA Screen    TSH    Elevated glucose        Relevant Orders    Comprehensive Metabolic Panel    Hemoglobin A1c      Diagnoses       Codes Comments    Routine general medical examination at a health care facility    -  Primary ICD-10-CM: Z00.00  ICD-9-CM: V70.0     Prostate cancer screening     ICD-10-CM: Z12.5  ICD-9-CM: V76.44     Primary hypertension     ICD-10-CM: I10  ICD-9-CM: 401.9     Mixed hyperlipidemia     ICD-10-CM: E78.2  ICD-9-CM: 272.2     Gastroesophageal reflux disease, unspecified whether esophagitis present     ICD-10-CM: K21.9  ICD-9-CM: 530.81     Chronic rhinitis     ICD-10-CM: J31.0  ICD-9-CM: 472.0     Thrombocytopenia, acquired (HCC)     ICD-10-CM: D69.6  ICD-9-CM: 287.5     Elevated TSH     ICD-10-CM: R79.89  ICD-9-CM: 794.5     Elevated glucose     ICD-10-CM: R73.09  ICD-9-CM: 790.29         Preventive medicine discussed, diet, exercise, healthy living discussed at length.  Discussed nutrition, physical activity, healthy weight, injury prevention, misuse of tobacco, alcohol and drugs, dental health, mental health, immunizations, screening    Part of this note may be an electronic transcription/translation of spoken language to printed text using the Dragon Dictation System.           Transcribed from ambient dictation for SUSANNE Davis MD by Rafita Rangel.  02/08/23   13:18 EST  Patient or patient representative verbalized consent to the visit  recording.  I have personally performed the services described in this document as transcribed by the above individual, and it is both accurate and complete.

## 2023-02-09 LAB
ALBUMIN SERPL-MCNC: 4.5 G/DL (ref 3.5–5.2)
ALBUMIN/GLOB SERPL: 2.6 G/DL
ALP SERPL-CCNC: 42 U/L (ref 39–117)
ALT SERPL W P-5'-P-CCNC: 36 U/L (ref 1–41)
ANION GAP SERPL CALCULATED.3IONS-SCNC: 7.2 MMOL/L (ref 5–15)
AST SERPL-CCNC: 34 U/L (ref 1–40)
BILIRUB SERPL-MCNC: 3.1 MG/DL (ref 0–1.2)
BUN SERPL-MCNC: 18 MG/DL (ref 8–23)
BUN/CREAT SERPL: 20 (ref 7–25)
CALCIUM SPEC-SCNC: 8.9 MG/DL (ref 8.6–10.5)
CHLORIDE SERPL-SCNC: 108 MMOL/L (ref 98–107)
CHOLEST SERPL-MCNC: 146 MG/DL (ref 0–200)
CO2 SERPL-SCNC: 26.8 MMOL/L (ref 22–29)
CREAT SERPL-MCNC: 0.9 MG/DL (ref 0.76–1.27)
DEPRECATED RDW RBC AUTO: 44.3 FL (ref 37–54)
EGFRCR SERPLBLD CKD-EPI 2021: 89.6 ML/MIN/1.73
ERYTHROCYTE [DISTWIDTH] IN BLOOD BY AUTOMATED COUNT: 12.5 % (ref 12.3–15.4)
GLOBULIN UR ELPH-MCNC: 1.7 GM/DL
GLUCOSE SERPL-MCNC: 103 MG/DL (ref 65–99)
HBA1C MFR BLD: 4.7 % (ref 4.8–5.6)
HCT VFR BLD AUTO: 41 % (ref 37.5–51)
HDLC SERPL-MCNC: 86 MG/DL (ref 40–60)
HGB BLD-MCNC: 14.2 G/DL (ref 13–17.7)
LDLC SERPL CALC-MCNC: 47 MG/DL (ref 0–100)
LDLC/HDLC SERPL: 0.55 {RATIO}
MCH RBC QN AUTO: 33.5 PG (ref 26.6–33)
MCHC RBC AUTO-ENTMCNC: 34.6 G/DL (ref 31.5–35.7)
MCV RBC AUTO: 96.7 FL (ref 79–97)
PLATELET # BLD AUTO: 63 10*3/MM3 (ref 140–450)
PMV BLD AUTO: 10.5 FL (ref 6–12)
POTASSIUM SERPL-SCNC: 4.5 MMOL/L (ref 3.5–5.2)
PROT SERPL-MCNC: 6.2 G/DL (ref 6–8.5)
PSA SERPL-MCNC: 0.37 NG/ML (ref 0–4)
RBC # BLD AUTO: 4.24 10*6/MM3 (ref 4.14–5.8)
SODIUM SERPL-SCNC: 142 MMOL/L (ref 136–145)
T4 FREE SERPL-MCNC: 0.95 NG/DL (ref 0.93–1.7)
TRIGL SERPL-MCNC: 62 MG/DL (ref 0–150)
TSH SERPL DL<=0.05 MIU/L-ACNC: 3.46 UIU/ML (ref 0.27–4.2)
VLDLC SERPL-MCNC: 13 MG/DL (ref 5–40)
WBC NRBC COR # BLD: 3.64 10*3/MM3 (ref 3.4–10.8)

## 2023-02-21 ENCOUNTER — OFFICE VISIT (OUTPATIENT)
Dept: ORTHOPEDIC SURGERY | Facility: CLINIC | Age: 73
End: 2023-02-21
Payer: COMMERCIAL

## 2023-02-21 VITALS
DIASTOLIC BLOOD PRESSURE: 88 MMHG | HEIGHT: 72 IN | BODY MASS INDEX: 25.83 KG/M2 | SYSTOLIC BLOOD PRESSURE: 124 MMHG | WEIGHT: 190.7 LBS

## 2023-02-21 DIAGNOSIS — M25.512 LEFT SHOULDER PAIN, UNSPECIFIED CHRONICITY: Primary | ICD-10-CM

## 2023-02-21 DIAGNOSIS — M75.20 TENDINITIS OF LONG HEAD OF BICEPS: ICD-10-CM

## 2023-02-21 DIAGNOSIS — M75.112 INCOMPLETE TEAR OF LEFT ROTATOR CUFF, UNSPECIFIED WHETHER TRAUMATIC: ICD-10-CM

## 2023-02-21 PROCEDURE — 99214 OFFICE O/P EST MOD 30 MIN: CPT | Performed by: ORTHOPAEDIC SURGERY

## 2023-02-21 NOTE — PROGRESS NOTES
"    [facility] [place] [facility] Note        Subjective     CC: Follow-up (11 month follow up- Incomplete tear of left rotator cuff)      HPI    Tucker Johnson is a 72 y.o. male.  Patient is here today for reevaluation of his left shoulder.  Roughly around , he had a right biceps rupture and was taken for surgery and treated with a repair of an upper subscap tear and coracoplasty.  He has done well after that surgery but we have not seen him most recently for a left shoulder issue.  An MRI was done in 2022.  He has had deltopectoral pain.  This has persisted and even worsened recently.  He says he has anterolateral pain and to hotspots in the deltopectoral region.    Overall, patient's symptoms are as above.    ROS:    Constiutional:Pt denies fever, chills, nausea, or vomiting.  MSK:as above        Objective      Past Medical History  Past Medical History:   Diagnosis Date   • Allergic    • Arthritis    • GERD (gastroesophageal reflux disease)    • Hyperlipidemia    • JERE (obstructive sleep apnea)     treated with surgery   • Rotator cuff syndrome 2021    the reason for appt.   • Tennis elbow 1993    surgery both elbows   • Tobacco abuse      Social History     Socioeconomic History   • Marital status:    Tobacco Use   • Smoking status: Former     Packs/day: 2.00     Years: 30.00     Pack years: 60.00     Types: Cigarettes, Cigars     Start date: 1968     Quit date: 1998     Years since quittin.1   • Smokeless tobacco: Never   Vaping Use   • Vaping Use: Never used   Substance and Sexual Activity   • Alcohol use: Yes     Alcohol/week: 12.0 standard drinks     Types: 12 Shots of liquor per week   • Drug use: No   • Sexual activity: Yes     Partners: Female          Physical Exam  /88   Ht 182.9 cm (72.01\")   Wt 86.5 kg (190 lb 11.2 oz)   BMI 25.86 kg/m²     Body mass index is 25.86 kg/m².    Patient is well nourished and well developed.        Ortho " Exam  Musculoskeletal   Upper Extremity   Left Shoulder     Inspection and Palpation:     Medial Border Scapular Tenderness-none    AC Joint Tenderness -moderate    Sensation is normal    Examination reveals no ecchymosis.        Strength and Tone:    Supraspinatus - 5/5    External Rotators-5/5    Infraspinatus - 5/5    Subscapularis - 5/5    Deltoid - 5/5     Range of Motion      Left Shoulder:    Internal Rotation: ROM - L4    External Rotation: AROM - 60 degrees    Elevation through flexion: AROM - 140 degrees        Impingement   Left shoulder    Cox-Konrad impingement test mildly positive    Neer impingement test negative     Functional Testing   Left shoulder    AC crossover adduction test causes triceps pain    Speeds test positive    Uppercut test negative    O'Briens test negative    Drop arm sign negative    Apprehension relocation negative        Imaging/Labs/EMG Reviewed:  Imaging Results (Last 24 Hours)     Procedure Component Value Units Date/Time    XR Shoulder 2+ View Left [[id number]] Resulted: 02/21/23 1248     Updated: 02/21/23 1249    Narrative:      Left Shoulder X-Ray    Indication: Pain    Study:  AP, axillary lateral, and scapular Y views    Comparison: Left shoulder 1/27/2022    Findings:  No acute fractures are visualized  No bony lesions are visualized.  Normal soft tissue appearance  AC joint: Moderate to severe joint space narrowing  Glenohumeral joint: Mild joint space narrowing  Acromion type: 2      Impression:    No acute bony abnormalities noted  Type II acromion  Mild glenohumeral joint space narrowing  Moderate to severe AC joint space narrowing              Assessment    Assessment:  1. Left shoulder pain, unspecified chronicity    2. Tendinitis of long head of biceps    3. Incomplete tear of left rotator cuff, unspecified whether traumatic        Plan:  1. Recommend over the counter anti-inflammatories for pain and/or swelling  2. History of partial-thickness rotator cuff  tear in the face of the long head the biceps tendinopathy--prior MRI was not overly convincing for abundance of fluid around the long of the biceps.  Plan will be for repeat MRI.  See him back to review the study.  We will pay close attention to the upper subscap, long of the biceps tendon, and anterior supraspinatus and AC joint.      Gennaro Nelson MD  02/21/23  12:54 EST      Dictated Utilizing Dragon Dictation.

## 2023-02-24 ENCOUNTER — TELEPHONE (OUTPATIENT)
Dept: ORTHOPEDIC SURGERY | Facility: CLINIC | Age: 73
End: 2023-02-24
Payer: COMMERCIAL

## 2023-02-24 DIAGNOSIS — E78.2 MIXED HYPERLIPIDEMIA: ICD-10-CM

## 2023-02-24 RX ORDER — ATORVASTATIN CALCIUM 20 MG/1
TABLET, FILM COATED ORAL
Qty: 30 TABLET | Refills: 0 | Status: SHIPPED | OUTPATIENT
Start: 2023-02-24

## 2023-03-15 ENCOUNTER — TELEPHONE (OUTPATIENT)
Dept: ORTHOPEDIC SURGERY | Facility: CLINIC | Age: 73
End: 2023-03-15

## 2023-03-15 NOTE — TELEPHONE ENCOUNTER
Caller: ANA CRISTINA GARCIA    Relationship: MRI FACILITY    Best call back number:  [phone]    What orders are you requesting (i.e. lab or imaging):  LEFT SHOULDER MRI ORDER (IN Epic 2/21/23)      Where will you receive your lab/imaging services:  PLEASE FAX ORDER TO [facility] @ [phone]    Additional notes:  THEY HAVE ASKED FOR THE ORDER PREVIOUSLY BUT NEVER GOT IT.

## 2023-03-28 ENCOUNTER — OFFICE VISIT (OUTPATIENT)
Dept: ORTHOPEDIC SURGERY | Facility: CLINIC | Age: 73
End: 2023-03-28
Payer: COMMERCIAL

## 2023-03-28 VITALS
BODY MASS INDEX: 25.91 KG/M2 | SYSTOLIC BLOOD PRESSURE: 148 MMHG | WEIGHT: 191.3 LBS | HEIGHT: 72 IN | DIASTOLIC BLOOD PRESSURE: 90 MMHG

## 2023-03-28 DIAGNOSIS — M75.112 INCOMPLETE TEAR OF LEFT ROTATOR CUFF, UNSPECIFIED WHETHER TRAUMATIC: ICD-10-CM

## 2023-03-28 DIAGNOSIS — M75.20 TENDINITIS OF LONG HEAD OF BICEPS: ICD-10-CM

## 2023-03-28 DIAGNOSIS — M19.012 ARTHRITIS OF LEFT ACROMIOCLAVICULAR JOINT: ICD-10-CM

## 2023-03-28 DIAGNOSIS — M25.512 LEFT SHOULDER PAIN, UNSPECIFIED CHRONICITY: Primary | ICD-10-CM

## 2023-03-28 PROCEDURE — 99214 OFFICE O/P EST MOD 30 MIN: CPT | Performed by: ORTHOPAEDIC SURGERY

## 2023-03-28 NOTE — PROGRESS NOTES
"    [facility] [place] [facility] Note        Subjective     CC: Follow-up (1 month follow up- Incomplete tear of left rotator cuff ( MRI 3/21/23) )      HPI    Tucker Johnson is a 73 y.o. male.  Patient is here today for follow-up of the MRI of his left shoulder.  Has had a prior MRI in 2022.  He has deltopectoral pain and anterolateral pain.    Overall, patient's symptoms are as above.    ROS:    Constiutional:Pt denies fever, chills, nausea, or vomiting.  MSK:as above        Objective      Past Medical History  Past Medical History:   Diagnosis Date   • Allergic    • Arthritis    • GERD (gastroesophageal reflux disease)    • Hyperlipidemia    • JERE (obstructive sleep apnea)     treated with surgery   • Rotator cuff syndrome 2021    the reason for appt.   • Tennis elbow 1993    surgery both elbows   • Tobacco abuse      Social History     Socioeconomic History   • Marital status:    Tobacco Use   • Smoking status: Former     Packs/day: 2.00     Years: 30.00     Pack years: 60.00     Types: Cigarettes, Cigars     Start date: 1968     Quit date: 1998     Years since quittin.2   • Smokeless tobacco: Never   Vaping Use   • Vaping Use: Never used   Substance and Sexual Activity   • Alcohol use: Yes     Alcohol/week: 12.0 standard drinks     Types: 12 Shots of liquor per week   • Drug use: No   • Sexual activity: Yes     Partners: Female          Physical Exam  /90   Ht 182.9 cm (72.01\")   Wt 86.8 kg (191 lb 4.8 oz)   BMI 25.94 kg/m²     Body mass index is 25.94 kg/m².    Patient is well nourished and well developed.        Ortho Exam  Musculoskeletal   Upper Extremity   Left Shoulder     Inspection and Palpation:     Medial Border Scapular Tenderness-none    AC Joint Tenderness -mild    Sensation is normal    Examination reveals no ecchymosis.        Strength and Tone:    Supraspinatus - 5/5 with pain    External Rotators-5/5    Infraspinatus - 5/5    Subscapularis -4+ out of " 5 Palm press with pain.  5 out of 5 belly press and bearhug    Deltoid - 5/5     Range of Motion      Left Shoulder:    Internal Rotation: ROM - L4    External Rotation: AROM - 60 degrees    Elevation through flexion: AROM - 140 degrees        Impingement   Left shoulder    Cox-Konrad impingement test negative    Neer impingement test negative     Functional Testing   Left shoulder    AC crossover adduction test negative    Speeds test positive    Uppercut test negative    O'Briens test negative    Drop arm sign negative    Apprehension relocation negative        Imaging/Labs/EMG Reviewed:  Imaging Results (Last 24 Hours)     ** No results found for the last 24 hours. **        We reviewed images and report of an MRI the patient's left shoulder from [facility] dated 3/21/2023.  Patient appears to have a subluxed biceps tendon through an upper 1/2-2/3 subscap tear.  Both the axial and sagittal images show the subscap tear.  There is some tendinopathy of the supraspinatus.    Assessment    Assessment:  1. Left shoulder pain, unspecified chronicity    2. Tendinitis of long head of biceps    3. Incomplete tear of left rotator cuff, unspecified whether traumatic    4. Arthritis of left acromioclavicular joint        Plan:  1. Recommend over the counter anti-inflammatories for pain and/or swelling  2. AC joint arthritis--minimally symptomatic at this point.  Observe for now.  3. Subscap tear with unstable biceps tendon--we had a lengthy discussion with the patient regarding treatment options and alternatives moving forward.  At this point, he prefers definitive management.  Plan will be for left shoulder arthroscopy and we will assess the size of his subscap tear and if it can be fixed intra-articularly, will but if it is quite large and involves 1/2-2/3 of the footprint, we will fix it subacromially.  We will then perform a supra pack arthroscopic biceps tenodesis.  The risk, benefits, potential hazards,  typical recovery and rehab as well as reasonable turns were discussed with him.  He had the opportunity questions and wished to proceed with scheduling.  We will get this done as an outpatient in the near future.      Gennaro Nelson MD  03/28/23  13:08 EDT      Dictated Utilizing Dragon Dictation.

## 2023-03-31 DIAGNOSIS — J31.0 CHRONIC RHINITIS: ICD-10-CM

## 2023-03-31 NOTE — TELEPHONE ENCOUNTER
Pt called to get refill of his zyrtec. States he called to request a refill over a week ago. Please let the patient know once this has been sent in as he is completely out.

## 2023-04-03 RX ORDER — CETIRIZINE HCL/PSEUDOEPHEDRINE 5 MG-120MG
TABLET, EXTENDED RELEASE 12 HR ORAL
Qty: 60 TABLET | Refills: 0 | Status: SHIPPED | OUTPATIENT
Start: 2023-04-03

## 2023-05-11 ENCOUNTER — TELEPHONE (OUTPATIENT)
Dept: ORTHOPEDIC SURGERY | Facility: CLINIC | Age: 73
End: 2023-05-11

## 2023-05-11 NOTE — TELEPHONE ENCOUNTER
Caller:  GAVIN    Reason for Call: [facility] IS CALLING ASKING IF THIS PATIENT NEEDS A PAIN PUMP SHE STATED HER EXTENSION IS 2022

## 2023-05-12 ENCOUNTER — OUTSIDE FACILITY SERVICE (OUTPATIENT)
Dept: ORTHOPEDIC SURGERY | Facility: CLINIC | Age: 73
End: 2023-05-12
Payer: MEDICARE

## 2023-05-12 DIAGNOSIS — Z98.890 S/P ARTHROSCOPY OF SHOULDER: Primary | ICD-10-CM

## 2023-05-12 RX ORDER — ACETAMINOPHEN 500 MG
1000 TABLET ORAL EVERY 8 HOURS
Qty: 90 TABLET | Refills: 1 | Status: SHIPPED | OUTPATIENT
Start: 2023-05-12

## 2023-05-12 RX ORDER — IBUPROFEN 400 MG/1
400 TABLET ORAL EVERY 8 HOURS PRN
Qty: 90 TABLET | Refills: 1 | Status: SHIPPED | OUTPATIENT
Start: 2023-05-12

## 2023-05-12 RX ORDER — OXYCODONE HYDROCHLORIDE 5 MG/1
5 TABLET ORAL EVERY 6 HOURS PRN
Qty: 30 TABLET | Refills: 0 | Status: SHIPPED | OUTPATIENT
Start: 2023-05-12

## 2023-05-12 RX ORDER — DOCUSATE SODIUM 100 MG/1
100 CAPSULE, LIQUID FILLED ORAL 2 TIMES DAILY PRN
Qty: 60 CAPSULE | Refills: 0 | Status: SHIPPED | OUTPATIENT
Start: 2023-05-12

## 2023-05-12 RX ORDER — ONDANSETRON 4 MG/1
4 TABLET, FILM COATED ORAL EVERY 8 HOURS PRN
Qty: 15 TABLET | Refills: 1 | Status: SHIPPED | OUTPATIENT
Start: 2023-05-12

## 2023-05-25 ENCOUNTER — OFFICE VISIT (OUTPATIENT)
Dept: ORTHOPEDIC SURGERY | Facility: CLINIC | Age: 73
End: 2023-05-25

## 2023-05-25 VITALS — TEMPERATURE: 97.5 F

## 2023-05-25 DIAGNOSIS — M75.20 TENDINITIS OF LONG HEAD OF BICEPS: ICD-10-CM

## 2023-05-25 DIAGNOSIS — Z98.890 S/P ROTATOR CUFF REPAIR: Primary | ICD-10-CM

## 2023-05-25 PROCEDURE — 99024 POSTOP FOLLOW-UP VISIT: CPT

## 2023-05-25 NOTE — PROGRESS NOTES
[facility] Office Follow Up       Office Follow Up Visit       Patient Name: Tucker Johnson    Chief Complaint:   Chief Complaint   Patient presents with   • Post-op     2 weeks Status post left shoulder arthroscopy with arthroscopic subscap repair, left shoulder arthroscopy with arthroscopic biceps tenodesis 5/12/2023       Referring Physician: No ref. provider found    History of Present Illness:   Tucker Johnson returns to clinic today for 2-week follow-up s/p left shoulder arthroscopy for subscapularis repair and biceps tenodesis 5/12/2023 with Dr. Nelson.  He reports to be doing well.  He has not needed pain medication for over a week.  He does report some swelling and bruising.  Most significant in his hands.  He did have 3 trigger finger releases a month prior to his shoulder surgery.     Subjective     Review of Systems   Constitutional: Negative.    HENT: Negative.    Eyes: Negative.    Respiratory: Negative.    Cardiovascular: Negative.    Gastrointestinal: Negative.    Endocrine: Negative.    Genitourinary: Negative.    Musculoskeletal: Positive for arthralgias.   Skin: Negative.    Allergic/Immunologic: Negative.    Neurological: Negative.    Hematological: Negative.    Psychiatric/Behavioral: Negative.         I have reviewed and updated the following portions of the patient's history and review of systems: allergies, current medications, past family history, past medical history, past social history, past surgical history and problem list.    Medications:   Current Outpatient Medications:   •  acetaminophen (TYLENOL) 500 MG tablet, Take 2 tablets by mouth Every 8 (Eight) Hours., Disp: 90 tablet, Rfl: 1  •  atorvastatin (LIPITOR) 20 MG tablet, TAKE 1 TABLET BY MOUTH EVERY DAY, Disp: 30 tablet, Rfl: 0  •  docusate sodium (Colace) 100 MG capsule, Take 1 capsule by mouth 2 (Two) Times a Day As Needed for Constipation., Disp:  60 capsule, Rfl: 0  •  ibuprofen (ADVIL,MOTRIN) 400 MG tablet, Take 1 tablet by mouth Every 8 (Eight) Hours As Needed for Mild Pain or Moderate Pain., Disp: 90 tablet, Rfl: 1  •  lansoprazole (PREVACID) 30 MG capsule, Take 1 capsule by mouth Daily., Disp: 90 capsule, Rfl: 3  •  losartan (Cozaar) 50 MG tablet, Take 1 tablet by mouth Daily. FOR BP, Disp: 90 tablet, Rfl: 3  •  Misc Natural Products (OSTEO BI-FLEX ADV DOUBLE ST PO), Take  by mouth., Disp: , Rfl:   •  Multiple Vitamins-Minerals (CENTRUM SILVER ADULT 50+ PO), Take  by mouth., Disp: , Rfl:   •  ZyrTEC-D Allergy & Congestion 5-120 MG per 12 hr tablet, TAKE 1 TABLET BY MOUTH TWO TIMES A DAY, Disp: 60 tablet, Rfl: 0  •  ondansetron (Zofran) 4 MG tablet, Take 1 tablet by mouth Every 8 (Eight) Hours As Needed for Nausea or Vomiting. (Patient not taking: Reported on 5/25/2023), Disp: 15 tablet, Rfl: 1  •  oxyCODONE (ROXICODONE) 5 MG immediate release tablet, Take 1 tablet by mouth Every 6 (Six) Hours As Needed for Severe Pain. (Patient not taking: Reported on 5/25/2023), Disp: 30 tablet, Rfl: 0    Allergies: No Known Allergies      Objective      Vital Signs:   Vitals:    05/25/23 0928   Temp: 97.5 °F (36.4 °C)       Ortho Exam:  General: comfortable  Vascular: 2+ radial pulse  Neurologic: sensation to light touch is intact distally, elbow flexion/elbow extension/wrist flexion/wrist extension/hand intrinsics intact, able to fire deltoid  Dermatologic: surgical incisions are well appearing, with no drainage or surrounding erythema; no signs or symptoms of infection or DVT    Results Review:  MRI outside films  This procedure was auto-finalized with no dictation required.         Assessment / Plan      Assessment:   Diagnoses and all orders for this visit:    1. S/P rotator cuff repair (Primary)    2. Tendinitis of long head of biceps          Plan:  1. Doing well s/p left subscap repair and biceps tenodesis with Dr. Nelson 5/18/2023.   2. Recommend  anti-inflammatories if needed for pain and swelling.  3. Suture removed today.  4. He will continue wearing sling until next visit in 4 weeks.  5. Given handout with group of 5 shoulder exercises to start at home.  6. He will start formal PT after 6 weeks from surgery. He will limit external rotator greater than 45 degrees for 12 weeks total from surgery.    History, diagnosis and treatment plan discussed with Dr. Nelson.    Follow Up:   4 weeks      Sudha Garcia PA-C  [facility]    Dictated using Dragon Speech Recognition.

## 2023-06-01 DIAGNOSIS — Z98.890 S/P ARTHROSCOPY OF SHOULDER: ICD-10-CM

## 2023-06-01 RX ORDER — OXYCODONE HYDROCHLORIDE 5 MG/1
5 TABLET ORAL EVERY 6 HOURS PRN
Qty: 30 TABLET | Refills: 0 | Status: SHIPPED | OUTPATIENT
Start: 2023-06-01

## 2023-08-03 ENCOUNTER — OFFICE VISIT (OUTPATIENT)
Dept: ORTHOPEDIC SURGERY | Facility: CLINIC | Age: 73
End: 2023-08-03
Payer: MEDICARE

## 2023-08-03 DIAGNOSIS — Z98.890 S/P ROTATOR CUFF REPAIR: ICD-10-CM

## 2023-08-03 DIAGNOSIS — Z98.890 S/P ARTHROSCOPY OF SHOULDER: Primary | ICD-10-CM

## 2023-08-03 DIAGNOSIS — M75.20 TENDINITIS OF LONG HEAD OF BICEPS: ICD-10-CM

## 2023-09-14 DIAGNOSIS — J31.0 CHRONIC RHINITIS: ICD-10-CM

## 2023-09-14 NOTE — TELEPHONE ENCOUNTER
"Caller: Tucker Johnson \"Bill\"    Relationship: Self    Best call back number: [phone]     Requested Prescriptions:   Requested Prescriptions     Pending Prescriptions Disp Refills    cetirizine-pseudoephedrine (ZyrTEC-D Allergy & Congestion) 5-120 MG per 12 hr tablet 60 tablet 0     Sig: Take 1 tablet by mouth 2 (Two) Times a Day.        Pharmacy where request should be sent: [facility] [id number] [place] [address] - [phone]  - [phone]      Last office visit with prescribing clinician: 2/8/2023   Last telemedicine visit with prescribing clinician: Visit date not found   Next office visit with prescribing clinician: 2/12/2024     Additional details provided by patient:     Does the patient have less than a 3 day supply:  [x] Yes  [] No    Would you like a call back once the refill request has been completed: [] Yes [x] No    If the office needs to give you a call back, can they leave a voicemail: [] Yes [x] No    Joshua Padilla Rep   09/14/23 11:37 EDT   "

## 2023-09-15 RX ORDER — CETIRIZINE HYDROCHLORIDE, PSEUDOEPHEDRINE HYDROCHLORIDE 5; 120 MG/1; MG/1
1 TABLET, FILM COATED, EXTENDED RELEASE ORAL 2 TIMES DAILY
Qty: 60 TABLET | Refills: 0 | Status: SHIPPED | OUTPATIENT
Start: 2023-09-15

## 2023-12-02 DIAGNOSIS — J31.0 CHRONIC RHINITIS: ICD-10-CM

## 2023-12-04 RX ORDER — CETIRIZINE HYDROCHLORIDE AND PSEUDOEPHEDRINE HYDROCHLORIDE 5; 120 MG/1; MG/1
1 TABLET, FILM COATED, EXTENDED RELEASE ORAL 2 TIMES DAILY
Qty: 60 TABLET | Refills: 0 | Status: SHIPPED | OUTPATIENT
Start: 2023-12-04

## 2024-01-16 DIAGNOSIS — E78.2 MIXED HYPERLIPIDEMIA: ICD-10-CM

## 2024-01-16 RX ORDER — ATORVASTATIN CALCIUM 20 MG/1
20 TABLET, FILM COATED ORAL DAILY
Qty: 90 TABLET | Refills: 3 | Status: SHIPPED | OUTPATIENT
Start: 2024-01-16

## 2024-01-16 RX ORDER — LOSARTAN POTASSIUM 50 MG/1
50 TABLET ORAL DAILY
Qty: 90 TABLET | Refills: 3 | Status: SHIPPED | OUTPATIENT
Start: 2024-01-16

## 2024-01-24 DIAGNOSIS — J31.0 CHRONIC RHINITIS: ICD-10-CM

## 2024-01-24 RX ORDER — CETIRIZINE HYDROCHLORIDE AND PSEUDOEPHEDRINE HYDROCHLORIDE 5; 120 MG/1; MG/1
1 TABLET, FILM COATED, EXTENDED RELEASE ORAL 2 TIMES DAILY
Qty: 60 TABLET | Refills: 1 | Status: SHIPPED | OUTPATIENT
Start: 2024-01-24

## 2024-02-05 ENCOUNTER — TELEPHONE (OUTPATIENT)
Dept: FAMILY MEDICINE CLINIC | Facility: CLINIC | Age: 74
End: 2024-02-05
Payer: MEDICARE

## 2024-02-05 DIAGNOSIS — M54.9 BACK PAIN, UNSPECIFIED BACK LOCATION, UNSPECIFIED BACK PAIN LATERALITY, UNSPECIFIED CHRONICITY: Primary | ICD-10-CM

## 2024-02-05 NOTE — TELEPHONE ENCOUNTER
Please place referral for back pain to [facility]. Per Aj, pt is there and they need this done ASAP.

## 2024-02-12 ENCOUNTER — OFFICE VISIT (OUTPATIENT)
Dept: FAMILY MEDICINE CLINIC | Facility: CLINIC | Age: 74
End: 2024-02-12
Payer: MEDICARE

## 2024-02-12 ENCOUNTER — LAB (OUTPATIENT)
Dept: FAMILY MEDICINE CLINIC | Facility: CLINIC | Age: 74
End: 2024-02-12
Payer: MEDICARE

## 2024-02-12 VITALS
HEIGHT: 72 IN | DIASTOLIC BLOOD PRESSURE: 80 MMHG | HEART RATE: 88 BPM | BODY MASS INDEX: 26.3 KG/M2 | RESPIRATION RATE: 15 BRPM | SYSTOLIC BLOOD PRESSURE: 142 MMHG | OXYGEN SATURATION: 97 % | WEIGHT: 194.2 LBS | TEMPERATURE: 98.1 F

## 2024-02-12 DIAGNOSIS — D69.6 THROMBOCYTOPENIA, ACQUIRED: ICD-10-CM

## 2024-02-12 DIAGNOSIS — R73.09 ELEVATED GLUCOSE: ICD-10-CM

## 2024-02-12 DIAGNOSIS — I10 PRIMARY HYPERTENSION: ICD-10-CM

## 2024-02-12 DIAGNOSIS — Z12.5 PROSTATE CANCER SCREENING: ICD-10-CM

## 2024-02-12 DIAGNOSIS — Z00.00 MEDICARE ANNUAL WELLNESS VISIT, SUBSEQUENT: ICD-10-CM

## 2024-02-12 DIAGNOSIS — R79.89 ELEVATED TSH: ICD-10-CM

## 2024-02-12 DIAGNOSIS — E78.2 MIXED HYPERLIPIDEMIA: ICD-10-CM

## 2024-02-12 DIAGNOSIS — K21.9 GASTROESOPHAGEAL REFLUX DISEASE, UNSPECIFIED WHETHER ESOPHAGITIS PRESENT: ICD-10-CM

## 2024-02-12 DIAGNOSIS — Z00.00 ROUTINE GENERAL MEDICAL EXAMINATION AT A HEALTH CARE FACILITY: Primary | ICD-10-CM

## 2024-02-12 DIAGNOSIS — J31.0 CHRONIC RHINITIS: ICD-10-CM

## 2024-02-12 LAB
DEPRECATED RDW RBC AUTO: 42 FL (ref 37–54)
ERYTHROCYTE [DISTWIDTH] IN BLOOD BY AUTOMATED COUNT: 12 % (ref 12.3–15.4)
HBA1C MFR BLD: 5.2 % (ref 4.8–5.6)
HCT VFR BLD AUTO: 41.8 % (ref 37.5–51)
HGB BLD-MCNC: 14.8 G/DL (ref 13–17.7)
MCH RBC QN AUTO: 34.3 PG (ref 26.6–33)
MCHC RBC AUTO-ENTMCNC: 35.4 G/DL (ref 31.5–35.7)
MCV RBC AUTO: 96.8 FL (ref 79–97)
PLATELET # BLD AUTO: 64 10*3/MM3 (ref 140–450)
PMV BLD AUTO: 10.7 FL (ref 6–12)
RBC # BLD AUTO: 4.32 10*6/MM3 (ref 4.14–5.8)
WBC NRBC COR # BLD AUTO: 4.07 10*3/MM3 (ref 3.4–10.8)

## 2024-02-12 PROCEDURE — 83036 HEMOGLOBIN GLYCOSYLATED A1C: CPT | Performed by: FAMILY MEDICINE

## 2024-02-12 PROCEDURE — 80061 LIPID PANEL: CPT | Performed by: FAMILY MEDICINE

## 2024-02-12 PROCEDURE — 1160F RVW MEDS BY RX/DR IN RCRD: CPT | Performed by: FAMILY MEDICINE

## 2024-02-12 PROCEDURE — 1159F MED LIST DOCD IN RCRD: CPT | Performed by: FAMILY MEDICINE

## 2024-02-12 PROCEDURE — 84443 ASSAY THYROID STIM HORMONE: CPT | Performed by: FAMILY MEDICINE

## 2024-02-12 PROCEDURE — G0103 PSA SCREENING: HCPCS | Performed by: FAMILY MEDICINE

## 2024-02-12 PROCEDURE — 84439 ASSAY OF FREE THYROXINE: CPT | Performed by: FAMILY MEDICINE

## 2024-02-12 PROCEDURE — 85027 COMPLETE CBC AUTOMATED: CPT | Performed by: FAMILY MEDICINE

## 2024-02-12 PROCEDURE — 36415 COLL VENOUS BLD VENIPUNCTURE: CPT | Performed by: FAMILY MEDICINE

## 2024-02-12 PROCEDURE — 99397 PER PM REEVAL EST PAT 65+ YR: CPT | Performed by: FAMILY MEDICINE

## 2024-02-12 PROCEDURE — 80053 COMPREHEN METABOLIC PANEL: CPT | Performed by: FAMILY MEDICINE

## 2024-02-12 PROCEDURE — G0439 PPPS, SUBSEQ VISIT: HCPCS | Performed by: FAMILY MEDICINE

## 2024-02-12 RX ORDER — LANSOPRAZOLE 30 MG/1
30 CAPSULE, DELAYED RELEASE ORAL DAILY
Qty: 90 CAPSULE | Refills: 3 | Status: SHIPPED | OUTPATIENT
Start: 2024-02-12

## 2024-02-13 LAB
ALBUMIN SERPL-MCNC: 4.7 G/DL (ref 3.5–5.2)
ALBUMIN/GLOB SERPL: 2.4 G/DL
ALP SERPL-CCNC: 43 U/L (ref 39–117)
ALT SERPL W P-5'-P-CCNC: 44 U/L (ref 1–41)
ANION GAP SERPL CALCULATED.3IONS-SCNC: 15 MMOL/L (ref 5–15)
AST SERPL-CCNC: 37 U/L (ref 1–40)
BILIRUB SERPL-MCNC: 2.7 MG/DL (ref 0–1.2)
BUN SERPL-MCNC: 21 MG/DL (ref 8–23)
BUN/CREAT SERPL: 20.2 (ref 7–25)
CALCIUM SPEC-SCNC: 9.7 MG/DL (ref 8.6–10.5)
CHLORIDE SERPL-SCNC: 104 MMOL/L (ref 98–107)
CHOLEST SERPL-MCNC: 154 MG/DL (ref 0–200)
CO2 SERPL-SCNC: 22 MMOL/L (ref 22–29)
CREAT SERPL-MCNC: 1.04 MG/DL (ref 0.76–1.27)
EGFRCR SERPLBLD CKD-EPI 2021: 74.9 ML/MIN/1.73
GLOBULIN UR ELPH-MCNC: 2 GM/DL
GLUCOSE SERPL-MCNC: 105 MG/DL (ref 65–99)
HDLC SERPL-MCNC: 81 MG/DL (ref 40–60)
LDLC SERPL CALC-MCNC: 60 MG/DL (ref 0–100)
LDLC/HDLC SERPL: 0.74 {RATIO}
POTASSIUM SERPL-SCNC: 4.7 MMOL/L (ref 3.5–5.2)
PROT SERPL-MCNC: 6.7 G/DL (ref 6–8.5)
PSA SERPL-MCNC: 0.28 NG/ML (ref 0–4)
SODIUM SERPL-SCNC: 141 MMOL/L (ref 136–145)
T4 FREE SERPL-MCNC: 0.97 NG/DL (ref 0.93–1.7)
TRIGL SERPL-MCNC: 65 MG/DL (ref 0–150)
TSH SERPL DL<=0.05 MIU/L-ACNC: 3.44 UIU/ML (ref 0.27–4.2)
VLDLC SERPL-MCNC: 13 MG/DL (ref 5–40)

## 2024-02-28 DIAGNOSIS — M25.572 ACUTE LEFT ANKLE PAIN: Primary | ICD-10-CM

## 2024-02-29 ENCOUNTER — HOSPITAL ENCOUNTER (OUTPATIENT)
Dept: GENERAL RADIOLOGY | Facility: HOSPITAL | Age: 74
Discharge: HOME | End: 2024-02-29
Admitting: FAMILY MEDICINE
Payer: MEDICARE

## 2024-02-29 DIAGNOSIS — M25.572 ACUTE LEFT ANKLE PAIN: ICD-10-CM

## 2024-02-29 PROCEDURE — 73610 X-RAY EXAM OF ANKLE: CPT

## 2024-03-14 DIAGNOSIS — K21.9 GASTROESOPHAGEAL REFLUX DISEASE, UNSPECIFIED WHETHER ESOPHAGITIS PRESENT: ICD-10-CM

## 2024-03-14 RX ORDER — LANSOPRAZOLE 30 MG/1
30 CAPSULE, DELAYED RELEASE ORAL DAILY
Qty: 90 CAPSULE | Refills: 3 | Status: SHIPPED | OUTPATIENT
Start: 2024-03-14

## 2024-03-25 ENCOUNTER — TELEPHONE (OUTPATIENT)
Dept: FAMILY MEDICINE CLINIC | Facility: CLINIC | Age: 74
End: 2024-03-25

## 2024-03-25 RX ORDER — AMOXICILLIN AND CLAVULANATE POTASSIUM 875; 125 MG/1; MG/1
1 TABLET, FILM COATED ORAL 2 TIMES DAILY
Qty: 20 TABLET | Refills: 0 | Status: SHIPPED | OUTPATIENT
Start: 2024-03-25 | End: 2024-04-04

## 2024-03-25 RX ORDER — METHYLPREDNISOLONE 4 MG/1
TABLET ORAL
Qty: 1 EACH | Refills: 0 | Status: SHIPPED | OUTPATIENT
Start: 2024-03-25

## 2024-03-25 NOTE — TELEPHONE ENCOUNTER
"    Caller: Tucker Johnson \"Bill\"    Relationship: Self    Best call back number: [phone]     What medication are you requesting: ANTIBIOTIC AND PREDNISONE    What are your current symptoms: SINUS INFECTION AND EAR PAIN/FULL-HEADACHE-RUNNY NOSE    How long have you been experiencing symptoms: YESTERDAY    Have you had these symptoms before:    [x] Yes  [] No    Have you been treated for these symptoms before:   [x] Yes  [] No    If a prescription is needed, what is your preferred pharmacy and phone number: [id number] - [place], [place] - [address] [facility] [address] - [phone]  - [phone]      Additional notes:CALL PATIENT TO ADVISE EITHER WAY. PATIENT GOING OUT OF TOWN IN 3 DAYS          "

## 2024-04-01 ENCOUNTER — TELEPHONE (OUTPATIENT)
Dept: FAMILY MEDICINE CLINIC | Facility: CLINIC | Age: 74
End: 2024-04-01
Payer: MEDICARE

## 2024-04-01 NOTE — TELEPHONE ENCOUNTER
Caller: Self- Tucker Johnson    [facility] call back number: [phone]     Patient called to make an appointment. Patient was seen previously by PCP for fluid on ears, has since been to urgent care where they informed him they did not discover any fluid in lungs, but patient has developed some wheezing and is concerned of potential pneumonia, patient requested to see Nunu to be seen soon as possible.

## 2024-04-02 ENCOUNTER — OFFICE VISIT (OUTPATIENT)
Dept: FAMILY MEDICINE CLINIC | Facility: CLINIC | Age: 74
End: 2024-04-02
Payer: MEDICARE

## 2024-04-02 VITALS
HEART RATE: 83 BPM | TEMPERATURE: 97.8 F | WEIGHT: 197 LBS | DIASTOLIC BLOOD PRESSURE: 76 MMHG | BODY MASS INDEX: 26.68 KG/M2 | SYSTOLIC BLOOD PRESSURE: 116 MMHG | OXYGEN SATURATION: 99 % | HEIGHT: 72 IN

## 2024-04-02 DIAGNOSIS — J01.90 ACUTE NON-RECURRENT SINUSITIS, UNSPECIFIED LOCATION: Primary | ICD-10-CM

## 2024-04-02 DIAGNOSIS — R05.9 COUGH, UNSPECIFIED TYPE: ICD-10-CM

## 2024-04-02 RX ORDER — OXYMETAZOLINE HYDROCHLORIDE 0.05 G/100ML
2 SPRAY NASAL 2 TIMES DAILY
COMMUNITY

## 2024-04-02 RX ORDER — PREDNISONE 10 MG/1
10 TABLET ORAL 2 TIMES DAILY
Qty: 10 TABLET | Refills: 0 | Status: SHIPPED | OUTPATIENT
Start: 2024-04-02

## 2024-04-02 RX ORDER — CEFDINIR 300 MG/1
300 CAPSULE ORAL 2 TIMES DAILY
Qty: 20 CAPSULE | Refills: 0 | Status: SHIPPED | OUTPATIENT
Start: 2024-04-02

## 2024-04-02 NOTE — PROGRESS NOTES
Subjective   Tucker Johnson is a 74 y.o. male  Sinus Problem (Ongoing sinus congestion, will be finished with Augmentin tomorrow, completed prednisone Saturday, went to  on saturday, started on zpack as well, still having some wheezing ) and Ear Fullness (Bilateral ear fullness)      History of Present Illness  Tucker presents for evaluation of persistent congestion. He is accompanied by his wife.    The patient is currently on his second antibiotic regimen, which includes prednisone and Augmentin, which he is expected to conclude by tomorrow. Despite typically feeling well by the third or fourth day of antibiotic use, his condition deteriorated significantly from Thursday to Saturday, characterized by chest tightness and wheezing. He sought medical attention at [facility] in [place], a condition he previously experienced, which subsequently developed into pneumonia. Although he does not typically suffer from pneumonia and does not typically require inhalers, he acknowledges his age-related predisposition. He sought urgent care for nasal congestion, where he was prescribed Flonase and a Z-Vu. Despite the administration of Z-Vu and Flonase, he continues to experience occasional wheezing, predominantly in the early morning and evening, which is exacerbated by exertion. He reported experiencing shortness of breath yesterday for the first time. He has been monitoring his temperature daily and reports a productive cough with yellow and clear sputum. He also reports difficulty hearing, which he attributes to the antibiotic treatment. He tolerates prednisone well, which does not disrupt his sleep, but increases his appetite. He takes Zyrtec-D sporadically.    He is not allergic to any antibiotics.    The following portions of the patient's history were reviewed and updated as appropriate: allergies, current medications, past social history and problem list    Review of Systems   HENT:  Positive for congestion, ear pain and  hearing loss.    Respiratory:  Positive for cough.        Objective     Vitals:    04/02/24 1518   BP: 116/76   Pulse: 83   Temp: 97.8 °F (36.6 °C)   SpO2: 99%       Physical Exam  Vitals and nursing note reviewed.   Constitutional:       General: He is not in acute distress.     Appearance: Normal appearance. He is well-developed. He is not ill-appearing, toxic-appearing or diaphoretic.   HENT:      Head: Normocephalic and atraumatic.      Right Ear: Tympanic membrane and ear canal normal.      Left Ear: Tympanic membrane and ear canal normal.      Nose: Mucosal edema and congestion present. No rhinorrhea.      Mouth/Throat:      Pharynx: No oropharyngeal exudate.   Eyes:      Pupils: Pupils are equal, round, and reactive to light.   Cardiovascular:      Rate and Rhythm: Normal rate and regular rhythm.   Pulmonary:      Effort: Pulmonary effort is normal.      Breath sounds: Normal breath sounds.   Neurological:      Mental Status: He is alert.         Assessment & Plan     Diagnoses and all orders for this visit:    1. Acute non-recurrent sinusitis, unspecified location (Primary)  The patient's nasal sinus passages exhibit significant inflammation, leading to inflammation throughout the tube that connects the nose to the ears, eustachian tube, leading to a probable infection. The patient will be prescribed cefdinir, to be taken twice daily for the ensuing week. Additionally, another round of prednisone will be prescribed. The patient is advised to continue taking Zyrtec-D.    2. Cough, unspecified type    Other orders  -     cefdinir (OMNICEF) 300 MG capsule; Take 1 capsule by mouth 2 (Two) Times a Day.  Dispense: 20 capsule; Refill: 0  -     predniSONE (DELTASONE) 10 MG tablet; Take 1 tablet by mouth 2 (Two) Times a Day.  Dispense: 10 tablet; Refill: 0     Transcribed from ambient dictation for Nunu Vargas PA-C by Anna Mishra.  04/02/24   17:04 EDT    Patient or patient representative verbalized consent  to the visit recording.  I have personally performed the services described in this document as transcribed by the above individual, and it is both accurate and complete.

## 2024-06-13 DIAGNOSIS — J31.0 CHRONIC RHINITIS: ICD-10-CM

## 2024-06-13 RX ORDER — CETIRIZINE HYDROCHLORIDE AND PSEUDOEPHEDRINE HYDROCHLORIDE 5; 120 MG/1; MG/1
1 TABLET, FILM COATED, EXTENDED RELEASE ORAL 2 TIMES DAILY
Qty: 60 TABLET | Refills: 0 | Status: SHIPPED | OUTPATIENT
Start: 2024-06-13

## 2024-08-21 DIAGNOSIS — J31.0 CHRONIC RHINITIS: ICD-10-CM

## 2024-08-21 RX ORDER — CETIRIZINE HYDROCHLORIDE AND PSEUDOEPHEDRINE HYDROCHLORIDE 5; 120 MG/1; MG/1
1 TABLET, FILM COATED, EXTENDED RELEASE ORAL 2 TIMES DAILY
Qty: 60 TABLET | Refills: 0 | Status: SHIPPED | OUTPATIENT
Start: 2024-08-21

## 2024-10-28 DIAGNOSIS — J31.0 CHRONIC RHINITIS: ICD-10-CM

## 2024-10-28 RX ORDER — CETIRIZINE HYDROCHLORIDE AND PSEUDOEPHEDRINE HYDROCHLORIDE 5; 120 MG/1; MG/1
1 TABLET, FILM COATED, EXTENDED RELEASE ORAL 2 TIMES DAILY
Qty: 60 TABLET | Refills: 5 | Status: SHIPPED | OUTPATIENT
Start: 2024-10-28

## 2024-11-26 ENCOUNTER — TELEPHONE (OUTPATIENT)
Dept: FAMILY MEDICINE CLINIC | Facility: CLINIC | Age: 74
End: 2024-11-26

## 2024-11-26 RX ORDER — AZITHROMYCIN 250 MG/1
TABLET, FILM COATED ORAL
Qty: 6 TABLET | Refills: 0 | Status: SHIPPED | OUTPATIENT
Start: 2024-11-26

## 2024-11-26 RX ORDER — METHYLPREDNISOLONE 4 MG/1
TABLET ORAL
Qty: 1 EACH | Refills: 0 | Status: SHIPPED | OUTPATIENT
Start: 2024-11-26

## 2024-11-26 NOTE — TELEPHONE ENCOUNTER
"    Caller: Tucker Johnson \"Bill\"    Relationship: Self    Best call back number:[phone]     What medication are you requesting: PREDNISONE AND ANTIBIOTIC    What are your current symptoms: HEADACHES, FLUID BUILD UP IN EARS, SORETHROAT,     How long have you been experiencing symptoms: FOR TWO DAYS    Have you had these symptoms before:    [x] Yes  [] No    Have you been treated for these symptoms before:   [x] Yes  [] No    If a prescription is needed, what is your preferred pharmacy and phone number:    [facility] [id number] - [place], [place] - [address] [address] - [phone]  - [phone]        Additional notes:PLEASE CALL IF ANY QUESTIONS OR WHEN COMPLETED.          "

## 2024-12-03 RX ORDER — PREDNISONE 10 MG/1
TABLET ORAL
Qty: 30 TABLET | Refills: 0 | Status: SHIPPED | OUTPATIENT
Start: 2024-12-03

## 2024-12-03 RX ORDER — CEFDINIR 300 MG/1
300 CAPSULE ORAL 2 TIMES DAILY
Qty: 20 CAPSULE | Refills: 0 | Status: SHIPPED | OUTPATIENT
Start: 2024-12-03

## 2024-12-03 NOTE — TELEPHONE ENCOUNTER
Patient called back and stated he is not 100% better, he states he has always needed a little bit more or a stronger medication to get back to 100%. He stated this happened in April, and more meds had to be called in.     Please advise and give patient a call back.

## 2025-01-10 DIAGNOSIS — E78.2 MIXED HYPERLIPIDEMIA: ICD-10-CM

## 2025-01-10 RX ORDER — LOSARTAN POTASSIUM 50 MG/1
50 TABLET ORAL DAILY
Qty: 90 TABLET | Refills: 3 | Status: SHIPPED | OUTPATIENT
Start: 2025-01-10

## 2025-01-10 RX ORDER — ATORVASTATIN CALCIUM 20 MG/1
20 TABLET, FILM COATED ORAL DAILY
Qty: 90 TABLET | Refills: 3 | Status: SHIPPED | OUTPATIENT
Start: 2025-01-10

## 2025-02-14 ENCOUNTER — HOSPITAL ENCOUNTER (OUTPATIENT)
Dept: GENERAL RADIOLOGY | Facility: HOSPITAL | Age: 75
Discharge: HOME | End: 2025-02-14
Payer: MEDICARE

## 2025-02-14 ENCOUNTER — OFFICE VISIT (OUTPATIENT)
Dept: FAMILY MEDICINE CLINIC | Facility: CLINIC | Age: 75
End: 2025-02-14
Payer: MEDICARE

## 2025-02-14 ENCOUNTER — LAB (OUTPATIENT)
Dept: LAB | Facility: HOSPITAL | Age: 75
End: 2025-02-14
Payer: MEDICARE

## 2025-02-14 VITALS
WEIGHT: 198.6 LBS | BODY MASS INDEX: 26.9 KG/M2 | SYSTOLIC BLOOD PRESSURE: 140 MMHG | DIASTOLIC BLOOD PRESSURE: 80 MMHG | TEMPERATURE: 97.7 F | HEART RATE: 90 BPM | OXYGEN SATURATION: 98 % | HEIGHT: 72 IN | RESPIRATION RATE: 16 BRPM

## 2025-02-14 DIAGNOSIS — E78.2 MIXED HYPERLIPIDEMIA: ICD-10-CM

## 2025-02-14 DIAGNOSIS — R79.89 ELEVATED TSH: ICD-10-CM

## 2025-02-14 DIAGNOSIS — Z12.5 PROSTATE CANCER SCREENING: ICD-10-CM

## 2025-02-14 DIAGNOSIS — D69.6 THROMBOCYTOPENIA, ACQUIRED: ICD-10-CM

## 2025-02-14 DIAGNOSIS — K21.9 GASTROESOPHAGEAL REFLUX DISEASE, UNSPECIFIED WHETHER ESOPHAGITIS PRESENT: ICD-10-CM

## 2025-02-14 DIAGNOSIS — R73.09 ELEVATED GLUCOSE: ICD-10-CM

## 2025-02-14 DIAGNOSIS — I10 PRIMARY HYPERTENSION: ICD-10-CM

## 2025-02-14 DIAGNOSIS — Z87.891 FORMER SMOKER: ICD-10-CM

## 2025-02-14 DIAGNOSIS — Z00.00 ROUTINE GENERAL MEDICAL EXAMINATION AT A HEALTH CARE FACILITY: ICD-10-CM

## 2025-02-14 DIAGNOSIS — Z00.00 ROUTINE GENERAL MEDICAL EXAMINATION AT A HEALTH CARE FACILITY: Primary | ICD-10-CM

## 2025-02-14 DIAGNOSIS — J31.0 CHRONIC RHINITIS: ICD-10-CM

## 2025-02-14 DIAGNOSIS — R05.3 CHRONIC COUGH: ICD-10-CM

## 2025-02-14 DIAGNOSIS — M54.31 RIGHT SIDED SCIATICA: ICD-10-CM

## 2025-02-14 DIAGNOSIS — Z00.00 MEDICARE ANNUAL WELLNESS VISIT, SUBSEQUENT: ICD-10-CM

## 2025-02-14 LAB
ALBUMIN SERPL-MCNC: 4.7 G/DL (ref 3.5–5.2)
ALBUMIN/GLOB SERPL: 2.4 G/DL
ALP SERPL-CCNC: 51 U/L (ref 39–117)
ALT SERPL W P-5'-P-CCNC: 55 U/L (ref 1–41)
ANION GAP SERPL CALCULATED.3IONS-SCNC: 10 MMOL/L (ref 5–15)
AST SERPL-CCNC: 43 U/L (ref 1–40)
BILIRUB SERPL-MCNC: 2.8 MG/DL (ref 0–1.2)
BUN SERPL-MCNC: 18 MG/DL (ref 8–23)
BUN/CREAT SERPL: 16.8 (ref 7–25)
CALCIUM SPEC-SCNC: 9.5 MG/DL (ref 8.6–10.5)
CHLORIDE SERPL-SCNC: 104 MMOL/L (ref 98–107)
CHOLEST SERPL-MCNC: 166 MG/DL (ref 0–200)
CO2 SERPL-SCNC: 27 MMOL/L (ref 22–29)
CREAT SERPL-MCNC: 1.07 MG/DL (ref 0.76–1.27)
DEPRECATED RDW RBC AUTO: 43.9 FL (ref 37–54)
EGFRCR SERPLBLD CKD-EPI 2021: 71.9 ML/MIN/1.73
ERYTHROCYTE [DISTWIDTH] IN BLOOD BY AUTOMATED COUNT: 12.5 % (ref 12.3–15.4)
GLOBULIN UR ELPH-MCNC: 2 GM/DL
GLUCOSE SERPL-MCNC: 96 MG/DL (ref 65–99)
HBA1C MFR BLD: 5.1 % (ref 4.8–5.6)
HCT VFR BLD AUTO: 44.1 % (ref 37.5–51)
HDLC SERPL-MCNC: 84 MG/DL (ref 40–60)
HGB BLD-MCNC: 15.5 G/DL (ref 13–17.7)
LDLC SERPL CALC-MCNC: 64 MG/DL (ref 0–100)
LDLC/HDLC SERPL: 0.74 {RATIO}
MCH RBC QN AUTO: 33.8 PG (ref 26.6–33)
MCHC RBC AUTO-ENTMCNC: 35.1 G/DL (ref 31.5–35.7)
MCV RBC AUTO: 96.1 FL (ref 79–97)
PLATELET # BLD AUTO: 66 10*3/MM3 (ref 140–450)
PMV BLD AUTO: 10.7 FL (ref 6–12)
POTASSIUM SERPL-SCNC: 4.4 MMOL/L (ref 3.5–5.2)
PROT SERPL-MCNC: 6.7 G/DL (ref 6–8.5)
PSA SERPL-MCNC: 0.35 NG/ML (ref 0–4)
RBC # BLD AUTO: 4.59 10*6/MM3 (ref 4.14–5.8)
SODIUM SERPL-SCNC: 141 MMOL/L (ref 136–145)
T4 FREE SERPL-MCNC: 0.98 NG/DL (ref 0.92–1.68)
TRIGL SERPL-MCNC: 99 MG/DL (ref 0–150)
TSH SERPL DL<=0.05 MIU/L-ACNC: 4.06 UIU/ML (ref 0.27–4.2)
VLDLC SERPL-MCNC: 18 MG/DL (ref 5–40)
WBC NRBC COR # BLD AUTO: 4.12 10*3/MM3 (ref 3.4–10.8)

## 2025-02-14 PROCEDURE — 83036 HEMOGLOBIN GLYCOSYLATED A1C: CPT

## 2025-02-14 PROCEDURE — 80061 LIPID PANEL: CPT

## 2025-02-14 PROCEDURE — 71046 X-RAY EXAM CHEST 2 VIEWS: CPT

## 2025-02-14 PROCEDURE — 84443 ASSAY THYROID STIM HORMONE: CPT

## 2025-02-14 PROCEDURE — 1159F MED LIST DOCD IN RCRD: CPT | Performed by: FAMILY MEDICINE

## 2025-02-14 PROCEDURE — 99397 PER PM REEVAL EST PAT 65+ YR: CPT | Performed by: FAMILY MEDICINE

## 2025-02-14 PROCEDURE — 84439 ASSAY OF FREE THYROXINE: CPT

## 2025-02-14 PROCEDURE — 80053 COMPREHEN METABOLIC PANEL: CPT

## 2025-02-14 PROCEDURE — 1160F RVW MEDS BY RX/DR IN RCRD: CPT | Performed by: FAMILY MEDICINE

## 2025-02-14 PROCEDURE — G0103 PSA SCREENING: HCPCS

## 2025-02-14 PROCEDURE — 85027 COMPLETE CBC AUTOMATED: CPT

## 2025-02-14 PROCEDURE — 36415 COLL VENOUS BLD VENIPUNCTURE: CPT

## 2025-02-14 PROCEDURE — 1126F AMNT PAIN NOTED NONE PRSNT: CPT | Performed by: FAMILY MEDICINE

## 2025-02-14 PROCEDURE — G0439 PPPS, SUBSEQ VISIT: HCPCS | Performed by: FAMILY MEDICINE

## 2025-02-14 RX ORDER — LOSARTAN POTASSIUM 50 MG/1
50 TABLET ORAL DAILY
Qty: 90 TABLET | Refills: 3 | Status: SHIPPED | OUTPATIENT
Start: 2025-02-14

## 2025-02-14 RX ORDER — PREDNISONE 10 MG/1
TABLET ORAL
Qty: 30 TABLET | Refills: 1 | Status: SHIPPED | OUTPATIENT
Start: 2025-02-14

## 2025-02-14 RX ORDER — LANSOPRAZOLE 30 MG/1
30 CAPSULE, DELAYED RELEASE ORAL DAILY
Qty: 90 CAPSULE | Refills: 3 | Status: SHIPPED | OUTPATIENT
Start: 2025-02-14

## 2025-02-14 NOTE — PROGRESS NOTES
The ABCs of the Annual Wellness Visit  Subsequent Medicare Wellness Visit    Chief Complaint   Patient presents with    Medicare Wellness-subsequent      Subjective   History of Present Illness:  Tucker Johnson is a 74 y.o. male who presents for a Subsequent Medicare Wellness Visit.  History of Present Illness      The following portions of the patient's history were reviewed and   updated as appropriate: allergies, current medications, past family history, past medical history, past social history, past surgical history, and problem list.    Compared to one year ago, the patient feels his physical   health is the same.    Compared to one year ago, the patient feels his mental   health is the same.    Recent Hospitalizations:  He was not admitted to the hospital during the last year.       Current Medical Providers:  Patient Care Team:  Jeet Davis MD as PCP - General (Family Medicine)  Atul Matthew MD as Consulting Physician (Orthopedic Surgery)    Outpatient Medications Prior to Visit   Medication Sig Dispense Refill    atorvastatin (LIPITOR) 20 MG tablet TAKE 1 TABLET DAILY 90 tablet 3    cetirizine-pseudoephedrine (ZyrTEC-D Allergy & Sinus) 5-120 MG per 12 hr tablet TAKE 1 TABLET BY MOUTH 2 TIMES A DAY 60 tablet 5    Misc Natural Products (OSTEO BI-FLEX ADV DOUBLE ST PO) Take  by mouth.      Multiple Vitamins-Minerals (CENTRUM SILVER ADULT 50+ PO) Take  by mouth.      lansoprazole (PREVACID) 30 MG capsule TAKE 1 CAPSULE DAILY 90 capsule 3    losartan (COZAAR) 50 MG tablet TAKE 1 TABLET DAILY FOR BLOOD PRESSURE 90 tablet 3    azithromycin (Zithromax Z-Vu) 250 MG tablet Take 2 tablets the first day, then 1 tablet daily for 4 days. 6 tablet 0    cefdinir (OMNICEF) 300 MG capsule Take 1 capsule by mouth 2 (Two) Times a Day. 20 capsule 0    oxymetazoline (AFRIN) 0.05 % nasal spray 2 sprays into the nostril(s) as directed by provider 2 (Two) Times a Day.      predniSONE (DELTASONE) 10 MG tablet  "3 tablets by mouth each morning for 5 days then 2 tablets each morning for 5 days then 1 tablet each morning for 5 days. 30 tablet 0     No facility-administered medications prior to visit.       No opioid medication identified on active medication list. I have reviewed chart for other potential  high risk medication/s and harmful drug interactions in the elderly.        Aspirin is not on active medication list.  Aspirin use is not indicated based on review of current medical condition/s. Risk of harm outweighs potential benefits.  .    Patient Active Problem List   Diagnosis    BPPV (benign paroxysmal positional vertigo)    Thrombocytopenia    GERD (gastroesophageal reflux disease)    Hyperlipidemia    Acute bronchitis     Advance Care Planning  ACP discussion was declined by the patient. Patient has an advance directive in EMR which is still valid.     Review of Systems      Objective      Vitals:    25 1015   BP: 140/80   Pulse: 90   Resp: 16   Temp: 97.7 °F (36.5 °C)   SpO2: 98%   Weight: 90.1 kg (198 lb 9.6 oz)   Height: 182.9 cm (72\")   PainSc: 0-No pain     BMI Readings from Last 1 Encounters:   25 26.94 kg/m²   BMI is within normal parameters. No follow-up required.  BMI Readings from Last 1 Encounters:   25 26.94 kg/m²   BMI is above normal parameters. Recommendations include: exercise counseling    Does the patient have evidence of cognitive impairment? No    Physical Exam  Physical Exam         Results             HEALTH RISK ASSESSMENT    Smoking Status:  Social History     Tobacco Use   Smoking Status Former    Current packs/day: 0.00    Average packs/day: 2.0 packs/day for 30.0 years (60.0 ttl pk-yrs)    Types: Cigarettes, Cigars    Start date: 1968    Quit date: 1998    Years since quittin.1   Smokeless Tobacco Never     Alcohol Consumption:  Social History     Substance and Sexual Activity   Alcohol Use Yes    Alcohol/week: 12.0 standard drinks of alcohol    Types: 12 " Shots of liquor per week    Comment: Weekly     Fall Risk Screen:    PRISCILA Fall Risk Assessment was completed, and patient is at LOW risk for falls.Assessment completed on:2025    Depression Screenin/14/2025    10:20 AM   PHQ-2/PHQ-9 Depression Screening   Little interest or pleasure in doing things Not at all   Feeling down, depressed, or hopeless Not at all       Health Habits and Functional and Cognitive Screenin/7/2025     2:02 PM   Functional & Cognitive Status   Do you have difficulty preparing food and eating? No    Do you have difficulty bathing yourself, getting dressed or grooming yourself? No    Do you have difficulty using the toilet? No    Do you have difficulty moving around from place to place? No    Do you have trouble with steps or getting out of a bed or a chair? No    Current Diet Well Balanced Diet    Dental Exam Up to date    Eye Exam Up to date    Exercise (times per week) 1 times per week    Current Exercises Include No Regular Exercise;House Cleaning;Yard Work    Do you need help using the phone?  No    Are you deaf or do you have serious difficulty hearing?  Yes    Do you need help to go to places out of walking distance? No    Do you need help shopping? No    Do you need help preparing meals?  No    Do you need help with housework?  No    Do you need help with laundry? No    Do you need help taking your medications? No    Do you need help managing money? No    Do you ever drive or ride in a car without wearing a seat belt? No    Have you felt unusual stress, anger or loneliness in the last month? No    Who do you live with? Spouse    If you need help, do you have trouble finding someone available to you? No    Have you been bothered in the last four weeks by sexual problems? No    Do you have difficulty concentrating, remembering or making decisions? No        Patient-reported       Age-appropriate Screening Schedule:  Refer to the list below for future screening  recommendations based on patient's age, sex and/or medical conditions. Orders for these recommended tests are listed in the plan section. The patient has been provided with a written plan.    Health Maintenance   Topic Date Due    TDAP/TD VACCINES (1 - Tdap) Never done    LIPID PANEL  02/12/2025    BMI FOLLOWUP  02/12/2025    ANNUAL WELLNESS VISIT  02/14/2026    COLORECTAL CANCER SCREENING  10/03/2032    HEPATITIS C SCREENING  Completed    COVID-19 Vaccine  Completed    RSV Vaccine - Adults  Completed    INFLUENZA VACCINE  Completed    Pneumococcal Vaccine 50+  Completed    ZOSTER VACCINE  Discontinued              Assessment & Plan     CMS Preventative Services Quick Reference  Risk Factors Identified During Encounter  None Identified  The above risks/problems have been discussed with the patient.  Follow up actions/plans if indicated are seen below in the Assessment/Plan Section.  Pertinent information has been shared with the patient in the After Visit Summary.    Diagnoses and all orders for this visit:    1. Routine general medical examination at a health care facility [Z00.00] (Primary)  -     CBC (No Diff); Future  -     Comprehensive Metabolic Panel; Future  -     Lipid Panel; Future  -     TSH; Future  -     T4, Free; Future    2. Medicare annual wellness visit, subsequent  -     CBC (No Diff); Future  -     Comprehensive Metabolic Panel; Future  -     Lipid Panel; Future  -     TSH; Future  -     T4, Free; Future    3. Prostate cancer screening  -     PSA Screen; Future    4. Primary hypertension  -     Comprehensive Metabolic Panel; Future  -     TSH; Future  -     T4, Free; Future    5. Mixed hyperlipidemia  -     Comprehensive Metabolic Panel; Future  -     Lipid Panel; Future    6. Gastroesophageal reflux disease, unspecified whether esophagitis present  -     CBC (No Diff); Future  -     Comprehensive Metabolic Panel; Future  -     lansoprazole (PREVACID) 30 MG capsule; Take 1 capsule by mouth Daily.   Dispense: 90 capsule; Refill: 3    7. Chronic rhinitis    8. Thrombocytopenia, acquired  -     CBC (No Diff); Future    9. Elevated TSH  -     TSH; Future  -     T4, Free; Future    10. Elevated glucose  -     Comprehensive Metabolic Panel; Future  -     Hemoglobin A1c; Future    11. Chronic cough  -     XR Chest PA & Lateral; Future    12. Former smoker  -     XR Chest PA & Lateral; Future    13. Right sided sciatica    Other orders  -     losartan (COZAAR) 50 MG tablet; Take 1 tablet by mouth Daily. for blood pressure  Dispense: 90 tablet; Refill: 3  -     predniSONE (DELTASONE) 10 MG tablet; 3 tablets by mouth each morning for 5 days then 2 tablets each morning for 5 days then 1 tablet each morning for 5 days.  Dispense: 30 tablet; Refill: 1      Assessment & Plan      Follow Up:  Return in about 1 year (around 2/14/2026) for Annual physical, Medicare Wellness.     An After Visit Summary and PPPS were given to the patient.

## 2025-02-14 NOTE — PROGRESS NOTES
Subjective   Tucker Johnson is a 74 y.o. male    Chief Complaint    Annual physical exam  Medicare annual wellness visit subsequent  Prostate cancer screening    History of Present Illness  History of Present Illness  The patient is a 74-year-old male who presents today for his annual physical examination, Medicare annual wellness visit, and prostate cancer screening. Follow-up regarding hypertension, hyperlipidemia, GERD, chronic rhinitis, thrombocytopenia acquired, elevated TSH level, and elevated glucose level will also be done. Appropriate labs, medication refills, and immunizations will be reviewed.    He reports overall good health but acknowledges a need to lose approximately 12 pounds. He maintains a balanced diet, avoiding junk food and fast food chains such as [employer] and [employer]. His home-cooked meals primarily consist of red meat, chicken, turkey, and vegetables. He has discontinued cigar smoking but continues to consume bourbon. He is fasting today for blood work. He is uncertain about the status of his medication refills.    He expresses interest in undergoing a lung scan due to his history of smoking. He smoked 3 packs of cigarettes daily for several years over 40 years ago, transitioned to pipe smoking, and then resumed smoking a pack of cigarettes daily following his divorce. He quit smoking 15 years ago but has since taken up cigar smoking.    He experienced sciatica following a long drive to [place] over a month ago, which he managed by placing a rolled-up blanket under his leg. This intervention provided relief; however, he continues to experience intermittent lower back pain, right buttock pain radiating down to his ankle, and occasional leg cramps at night. He also reports spontaneous leg jerking at night, which disrupts his sleep.    Supplemental Information  He has been experiencing symptoms of a cold or allergy for the past 2 weeks, including sneezing 10 to 12 times daily and  frequent nose blowing. He does not feel unwell and reports occasional nosebleeds when blowing his nose.          The following portions of the patient's history were reviewed and updated as appropriate: allergies, current medications, past social history and problem list    Review of Systems   Constitutional: Negative.  Negative for chills, fatigue, fever and unexpected weight change.   HENT: Negative.     Eyes: Negative.    Respiratory: Negative.  Negative for cough, chest tightness, shortness of breath and wheezing.    Cardiovascular: Negative.  Negative for chest pain, palpitations and leg swelling.   Gastrointestinal: Negative.  Negative for abdominal pain, nausea and vomiting.   Endocrine: Negative.  Negative for polydipsia, polyphagia and polyuria.   Genitourinary: Negative.  Negative for dysuria, frequency and urgency.   Musculoskeletal: Negative.  Negative for arthralgias, back pain and myalgias.   Skin: Negative.  Negative for color change and rash.   Allergic/Immunologic: Negative.    Neurological: Negative.  Negative for dizziness, syncope, weakness and headaches.   Hematological: Negative.  Negative for adenopathy. Does not bruise/bleed easily.   Psychiatric/Behavioral: Negative.     All other systems reviewed and are negative.    Objective     Vitals:    02/14/25 1015   BP: 140/80   Pulse: 90   Resp: 16   Temp: 97.7 °F (36.5 °C)   SpO2: 98%       Physical Exam  Vitals and nursing note reviewed.   Constitutional:       General: He is not in acute distress.     Appearance: Normal appearance. He is well-developed. He is not ill-appearing, toxic-appearing or diaphoretic.   HENT:      Head: Normocephalic and atraumatic.      Right Ear: External ear normal.      Left Ear: External ear normal.   Eyes:      Conjunctiva/sclera: Conjunctivae normal.      Pupils: Pupils are equal, round, and reactive to light.   Neck:      Thyroid: No thyromegaly.      Vascular: No carotid bruit or JVD.   Cardiovascular:      Rate  and Rhythm: Normal rate and regular rhythm.      Pulses: Normal pulses.      Heart sounds: Normal heart sounds. No murmur heard.  Pulmonary:      Effort: Pulmonary effort is normal. No respiratory distress.      Breath sounds: Normal breath sounds.   Abdominal:      General: Bowel sounds are normal.      Palpations: Abdomen is soft. There is no mass.      Tenderness: There is no abdominal tenderness.   Musculoskeletal:         General: No swelling. Normal range of motion.      Cervical back: Normal range of motion and neck supple.   Lymphadenopathy:      Cervical: No cervical adenopathy.   Skin:     General: Skin is warm and dry.      Findings: No lesion or rash.   Neurological:      Mental Status: He is alert and oriented to person, place, and time.      Cranial Nerves: No cranial nerve deficit.      Sensory: No sensory deficit.      Motor: No weakness.      Coordination: Coordination normal.      Gait: Gait normal.      Deep Tendon Reflexes: Reflexes are normal and symmetric.   Psychiatric:         Mood and Affect: Mood normal.         Behavior: Behavior normal.         Thought Content: Thought content normal.         Judgment: Judgment normal.   Physical Exam  Lungs are clear.    Assessment & Plan   Assessment & Plan  1. Annual physical examination.  He is due for his annual physical examination and Medicare annual wellness visit. Appropriate labs, medication refills, and immunizations will be reviewed.    2. Prostate cancer screening.  He is here for his prostate cancer screening.    3. Hypertension.    4. Hyperlipidemia.    5. Gastroesophageal reflux disease (GERD).    6. Chronic rhinitis.    7. Thrombocytopenia acquired.    8. Elevated TSH level.    9. Elevated glucose level.    10. Sciatica.  He reports lower back pain radiating to his right buttock and down his leg, likely due to prolonged driving. The pain is consistent with sciatica. A prescription for prednisone will be provided, with a tapering dose  starting at 30 mg, reducing to 20 mg, and finally to 10 mg over a 15-day course. If symptoms persist, a referral for physical therapy will be considered.    11. Smoking history.  He has a significant smoking history, including smoking 3 packs a day for several years and later smoking cigars. A chest x-ray will be ordered today. If the x-ray shows any abnormalities, a CT scan will be performed and will be covered by his insurance.    Problems Addressed this Visit          Cardiac and Vasculature    Hyperlipidemia    Relevant Orders    Comprehensive Metabolic Panel    Lipid Panel       Gastrointestinal Abdominal     GERD (gastroesophageal reflux disease)    Relevant Medications    lansoprazole (PREVACID) 30 MG capsule    Other Relevant Orders    CBC (No Diff)    Comprehensive Metabolic Panel     Other Visit Diagnoses       Routine general medical examination at a health care facility [Z00.00]    -  Primary    Relevant Orders    CBC (No Diff)    Comprehensive Metabolic Panel    Lipid Panel    TSH    T4, Free    Medicare annual wellness visit, subsequent        Relevant Orders    CBC (No Diff)    Comprehensive Metabolic Panel    Lipid Panel    TSH    T4, Free    Prostate cancer screening        Relevant Orders    PSA Screen    Primary hypertension        Relevant Medications    losartan (COZAAR) 50 MG tablet    Other Relevant Orders    Comprehensive Metabolic Panel    TSH    T4, Free    Chronic rhinitis        Thrombocytopenia, acquired        Relevant Orders    CBC (No Diff)    Elevated TSH        Relevant Orders    TSH    T4, Free    Elevated glucose        Relevant Orders    Comprehensive Metabolic Panel    Hemoglobin A1c    Chronic cough        Relevant Orders    XR Chest PA & Lateral    Former smoker        Relevant Orders    XR Chest PA & Lateral    Right sided sciatica              Diagnoses         Codes Comments    Routine general medical examination at a health care facility [Z00.00]    -  Primary ICD-10-CM:  Z00.00  ICD-9-CM: V70.0     Medicare annual wellness visit, subsequent     ICD-10-CM: Z00.00  ICD-9-CM: V70.0     Prostate cancer screening     ICD-10-CM: Z12.5  ICD-9-CM: V76.44     Primary hypertension     ICD-10-CM: I10  ICD-9-CM: 401.9     Mixed hyperlipidemia     ICD-10-CM: E78.2  ICD-9-CM: 272.2     Gastroesophageal reflux disease, unspecified whether esophagitis present     ICD-10-CM: K21.9  ICD-9-CM: 530.81     Chronic rhinitis     ICD-10-CM: J31.0  ICD-9-CM: 472.0     Thrombocytopenia, acquired     ICD-10-CM: D69.6  ICD-9-CM: 287.5     Elevated TSH     ICD-10-CM: R79.89  ICD-9-CM: 794.5     Elevated glucose     ICD-10-CM: R73.09  ICD-9-CM: 790.29     Chronic cough     ICD-10-CM: R05.3  ICD-9-CM: 786.2     Former smoker     ICD-10-CM: Z87.891  ICD-9-CM: V15.82     Right sided sciatica     ICD-10-CM: M54.31  ICD-9-CM: 724.3             Preventive medicine discussed, diet, exercise, healthy living discussed at length.  Discussed nutrition, physical activity, healthy weight, injury prevention, misuse of tobacco, alcohol and drugs, dental health, mental health, immunizations, screening    Part of this note may be an electronic transcription/translation of spoken language to printed text using the Dragon Dictation System.

## 2025-03-09 ENCOUNTER — RESULTS FOLLOW-UP (OUTPATIENT)
Dept: GENERAL RADIOLOGY | Facility: HOSPITAL | Age: 75
End: 2025-03-09
Payer: MEDICARE

## 2025-03-09 ENCOUNTER — RESULTS FOLLOW-UP (OUTPATIENT)
Dept: FAMILY MEDICINE CLINIC | Facility: CLINIC | Age: 75
End: 2025-03-09
Payer: MEDICARE

## 2025-07-17 ENCOUNTER — PATIENT MESSAGE (OUTPATIENT)
Dept: FAMILY MEDICINE CLINIC | Facility: CLINIC | Age: 75
End: 2025-07-17
Payer: MEDICARE

## 2025-07-17 RX ORDER — PREDNISONE 10 MG/1
TABLET ORAL
Qty: 30 TABLET | Refills: 1 | Status: SHIPPED | OUTPATIENT
Start: 2025-07-17

## 2025-07-29 DIAGNOSIS — J31.0 CHRONIC RHINITIS: ICD-10-CM

## 2025-07-29 RX ORDER — CETIRIZINE HCL/PSEUDOEPHEDRINE 5 MG-120MG
1 TABLET, EXTENDED RELEASE 12 HR ORAL 2 TIMES DAILY
Qty: 60 TABLET | Refills: 0 | Status: SHIPPED | OUTPATIENT
Start: 2025-07-29